# Patient Record
Sex: FEMALE | Race: WHITE | NOT HISPANIC OR LATINO | Employment: FULL TIME | URBAN - METROPOLITAN AREA
[De-identification: names, ages, dates, MRNs, and addresses within clinical notes are randomized per-mention and may not be internally consistent; named-entity substitution may affect disease eponyms.]

---

## 2017-11-13 ENCOUNTER — OFFICE VISIT (OUTPATIENT)
Dept: FAMILY MEDICINE | Facility: CLINIC | Age: 52
End: 2017-11-13
Payer: COMMERCIAL

## 2017-11-13 ENCOUNTER — RADIANT APPOINTMENT (OUTPATIENT)
Dept: GENERAL RADIOLOGY | Facility: CLINIC | Age: 52
End: 2017-11-13
Attending: NURSE PRACTITIONER
Payer: COMMERCIAL

## 2017-11-13 VITALS
WEIGHT: 165 LBS | OXYGEN SATURATION: 99 % | DIASTOLIC BLOOD PRESSURE: 84 MMHG | HEIGHT: 65 IN | HEART RATE: 82 BPM | BODY MASS INDEX: 27.49 KG/M2 | TEMPERATURE: 98.4 F | SYSTOLIC BLOOD PRESSURE: 110 MMHG

## 2017-11-13 DIAGNOSIS — M43.10 ANTEROLISTHESIS: Primary | ICD-10-CM

## 2017-11-13 DIAGNOSIS — S39.012A STRAIN OF LUMBAR REGION, INITIAL ENCOUNTER: ICD-10-CM

## 2017-11-13 PROCEDURE — 99214 OFFICE O/P EST MOD 30 MIN: CPT | Performed by: NURSE PRACTITIONER

## 2017-11-13 PROCEDURE — 72100 X-RAY EXAM L-S SPINE 2/3 VWS: CPT

## 2017-11-13 RX ORDER — CYCLOBENZAPRINE HCL 10 MG
5-10 TABLET ORAL 3 TIMES DAILY PRN
Qty: 30 TABLET | Refills: 1 | Status: SHIPPED | OUTPATIENT
Start: 2017-11-13

## 2017-11-13 RX ORDER — HYDROCODONE BITARTRATE AND ACETAMINOPHEN 5; 325 MG/1; MG/1
1 TABLET ORAL EVERY 6 HOURS PRN
Qty: 18 TABLET | Refills: 0 | Status: SHIPPED | OUTPATIENT
Start: 2017-11-13 | End: 2017-12-14

## 2017-11-13 NOTE — PATIENT INSTRUCTIONS
Will have you follow-up with Orthopedics   Heat or ice may help.   Ibuprofen up to 600 mg four times daily, aleve 2 pills twice daily or acetaminophen 2 pills four times daily may help.   Stronger pain pills can be used if needed. Take muscle relaxant, Flexeril (cyclobenzaprine) up to 3 times a day as needed for pain.   OK to take the Take hydrocodone/acetaminophen 1 or 2 pills every 6 hours as needed for pain.   Recommend physical therapy if not improving.  Will need to follow-up with your primary care provider for a referral if not improving,   Recheck in 2-3 weeks if not improving or other problems.   Staying active will help. Those who continue work and daily activities get better faster. Avoid painful activities.           Understanding Lumbosacral Strain    Lumbosacral strain is a medical term for an injury that causes low back pain. The lumbosacral area (low back) is between the bottom of the ribcage and the top of the buttocks. A strain is tearing of muscles and tendons. These tears can be very small but still cause pain.  How a lumbosacral strain happens  Muscles and tendons connected to the spine can be strained in a number of ways:    Sitting or standing in the same position for long periods of time. This can harm the low back over time. Poor posture can make low back pain more likely.    Moving the muscles and tendons past their usual range of motion. This can cause a sudden injury. This can happen when you twist, bend over, or lift something heavy. Not using correct technique for sports or tasks like lifting can make back injury more likely.    Accidents or falls  Lumbosacral strain can be caused by other problems, but these are less common.  Symptoms of lumbosacral strain  Symptoms may include:    Pain in the back, often on one side    Pain that gets worse with movement and gets better with rest    Inability to move as freely as usual    Swelling, slight redness, and skin warmth in the painful  area  Treatment for lumbosacral strain  Low back pain often goes away by itself within several weeks. But it often comes back. Treatment focuses on reducing pain and avoiding further injury. Bed rest is usually not recommended for low back pain. Treatments may include:    Avoiding or changing the action that caused the problem. This helps prevent injuring the tissues again.    Prescription or over-the-counter pain medicines. These help reduce inflammation, swelling, and pain.    Cold or heat packs. These help reduce pain and swelling.    Stretching and other exercises. These improve flexibility and strength.    Physical therapy. This usually includes exercises and other treatments.    Injections of medicine. This may relieve symptoms.  If these treatments do not relieve symptoms, your healthcare provider may order imaging tests to learn more about the problem. Sometimes you may need surgery.  Possible complications of lumbosacral strain  If the cause of the pain is not addressed, symptoms may return or get worse. Follow your healthcare provider s instructions on lifestyle changes and treating your back.     When to call your healthcare provider  Call your healthcare provider right away if you have any of these:    Fever of 100.4 F (38 C) or higher, or as directed    Numbness, tingling, or weakness    Problems with bowel or bladder control, or problems having sex    Pain that does not go away, or gets worse    New symptoms   Date Last Reviewed: 3/10/2016    3445-5849 [employer]. [address] [place] [address], [place] [address]. All rights reserved. This information is not intended as a substitute for professional medical care. Always follow your healthcare professional's instructions.

## 2017-11-13 NOTE — NURSING NOTE
"Chief Complaint   Patient presents with     Back Pain       Initial /84  Pulse 82  Temp 98.4  F (36.9  C) (Tympanic)  Ht 5' 4.5\" (1.638 m)  Wt 165 lb (74.8 kg)  SpO2 99%  BMI 27.88 kg/m2 Estimated body mass index is 27.88 kg/(m^2) as calculated from the following:    Height as of this encounter: 5' 4.5\" (1.638 m).    Weight as of this encounter: 165 lb (74.8 kg).  Medication Reconciliation: complete    Health Maintenance that is potentially due pending provider review:  NONE    n/a    Is there anyone who you would like to be able to receive your results? Yes  If yes have patient fill out [facility]      "

## 2017-11-13 NOTE — MR AVS SNAPSHOT
After Visit Summary   11/13/2017    Valery Bauman    MRN: [id number]           Patient Information     Date Of Birth          1965        Visit Information        Provider Department      11/13/2017 12:40 PM Taylor Olson APRN [facility]        Today's Diagnoses     Strain of lumbar region, initial encounter    -  1    Spondylolisthesis of lumbosacral region          Care Instructions    Will have you follow-up with Orthopedics   Heat or ice may help.   Ibuprofen up to 600 mg four times daily, aleve 2 pills twice daily or acetaminophen 2 pills four times daily may help.   Stronger pain pills can be used if needed. Take muscle relaxant, Flexeril (cyclobenzaprine) up to 3 times a day as needed for pain.   OK to take the Take hydrocodone/acetaminophen 1 or 2 pills every 6 hours as needed for pain.   Recommend physical therapy if not improving.  Will need to follow-up with your primary care provider for a referral if not improving,   Recheck in 2-3 weeks if not improving or other problems.   Staying active will help. Those who continue work and daily activities get better faster. Avoid painful activities.           Understanding Lumbosacral Strain    Lumbosacral strain is a medical term for an injury that causes low back pain. The lumbosacral area (low back) is between the bottom of the ribcage and the top of the buttocks. A strain is tearing of muscles and tendons. These tears can be very small but still cause pain.  How a lumbosacral strain happens  Muscles and tendons connected to the spine can be strained in a number of ways:    Sitting or standing in the same position for long periods of time. This can harm the low back over time. Poor posture can make low back pain more likely.    Moving the muscles and tendons past their usual range of motion. This can cause a sudden injury. This can happen when you twist, bend over, or lift something heavy. Not using correct technique  for sports or tasks like lifting can make back injury more likely.    Accidents or falls  Lumbosacral strain can be caused by other problems, but these are less common.  Symptoms of lumbosacral strain  Symptoms may include:    Pain in the back, often on one side    Pain that gets worse with movement and gets better with rest    Inability to move as freely as usual    Swelling, slight redness, and skin warmth in the painful area  Treatment for lumbosacral strain  Low back pain often goes away by itself within several weeks. But it often comes back. Treatment focuses on reducing pain and avoiding further injury. Bed rest is usually not recommended for low back pain. Treatments may include:    Avoiding or changing the action that caused the problem. This helps prevent injuring the tissues again.    Prescription or over-the-counter pain medicines. These help reduce inflammation, swelling, and pain.    Cold or heat packs. These help reduce pain and swelling.    Stretching and other exercises. These improve flexibility and strength.    Physical therapy. This usually includes exercises and other treatments.    Injections of medicine. This may relieve symptoms.  If these treatments do not relieve symptoms, your healthcare provider may order imaging tests to learn more about the problem. Sometimes you may need surgery.  Possible complications of lumbosacral strain  If the cause of the pain is not addressed, symptoms may return or get worse. Follow your healthcare provider s instructions on lifestyle changes and treating your back.     When to call your healthcare provider  Call your healthcare provider right away if you have any of these:    Fever of 100.4 F (38 C) or higher, or as directed    Numbness, tingling, or weakness    Problems with bowel or bladder control, or problems having sex    Pain that does not go away, or gets worse    New symptoms   Date Last Reviewed: 3/10/2016    3404-0698 [employer] [address]  [place] [address]. All rights reserved. This information is not intended as a substitute for professional medical care. Always follow your healthcare professional's instructions.              Follow-ups after your visit        Additional Services     ORTHO  REFERRAL       [facility] is referring you to the Orthopedic  Services at [facility].       The  Representative will assist you in the coordination of your Orthopedic and Musculoskeletal Care as prescribed by your physician.    The  Representative will call you within 1 business day to help schedule your appointment, or you may contact the  Representative at:    All areas ~ [phone]     Type of Referral : Spine: Lumbar  **Choose Medical Spine Specialist (unless patient was seen by a Medical Spine Specialist within the past 6 months).**  Surgical Evaluation is advised if the patient presents with one or more of the following red flags: Evidence of Spinal Tumor, Infection or Fracture, Cauda Equina Syndrome, Sudden or Progressive Weakness, Loss of Bowel or Bladder Control, or any other documented emergent neurological condition resulting from a Lumbar Spinal Condition. Medical Spine Specialist        Timeframe requested: 3 - 5 days    Coverage of these services is subject to the terms and limitations of your health insurance plan.  Please call member services at your health plan with any benefit or coverage questions.      If X-rays, CT or MRI's have been performed, please contact the facility where they were done to arrange for , prior to your scheduled appointment.  Please bring this referral request to your appointment and present it to your specialist.                  Who to contact     If you have questions or need follow up information about today's clinic visit or your schedule please contact [facility] directly at  "[phone].  Normal or non-critical lab and imaging results will be communicated to you by MyChart, letter or phone within 4 business days after the clinic has received the results. If you do not hear from us within 7 days, please contact the clinic through [employer] or phone. If you have a critical or abnormal lab result, we will notify you by phone as soon as possible.  Submit refill requests through "[employer] or call your pharmacy and they will forward the refill request to us. Please allow 3 business days for your refill to be completed.          Additional Information About Your Visit        [employer] Information     "[employer] lets you send messages to your doctor, view your test results, renew your prescriptions, schedule appointments and more. To sign up, go to [email or web address] . Click on \"Log in\" on the left side of the screen, which will take you to the Welcome page. Then click on \"Sign up Now\" on the right side of the page.     You will be asked to enter the access code listed below, as well as some personal information. Please follow the directions to create your username and password.     Your access code is: [email or web address]  Expires: 2018  1:38 PM     Your access code will  in 90 days. If you need help or a new code, please call your [facility] clinic or [phone].        Care EveryWhere ID     This is your Care EveryWhere ID. This could be used by other organizations to access your [facility] medical records  [id number]        Your Vitals Were     Pulse Temperature Height Pulse Oximetry BMI (Body Mass Index)       82 98.4  F (36.9  C) (Tympanic) 5' 4.5\" (1.638 m) 99% 27.88 kg/m2        Blood Pressure from Last 3 Encounters:   17 110/84   14 112/69   13 110/70    Weight from Last 3 Encounters:   17 165 lb (74.8 kg)   14 165 lb (74.8 kg)   13 161 lb (73 kg)              We Performed the Following     ORTHO  REFERRAL          Today's Medication " Changes          These changes are accurate as of: 11/13/17  1:38 PM.  If you have any questions, ask your nurse or doctor.               Start taking these medicines.        Dose/Directions    cyclobenzaprine 10 MG tablet   Commonly known as:  FLEXERIL   Used for:  Spondylolisthesis of lumbosacral region   Started by:  Taylor Olson APRN CNP        Dose:  5-10 mg   Take 0.5-1 tablets (5-10 mg) by mouth 3 times daily as needed for muscle spasms   Quantity:  30 tablet   Refills:  1       HYDROcodone-acetaminophen 5-325 MG per tablet   Commonly known as:  NORCO   Used for:  Strain of lumbar region, initial encounter   Started by:  Taylor Olson APRN CNP        Dose:  1 tablet   Take 1 tablet by mouth every 6 hours as needed for pain maximum 4 tablet(s) per day   Quantity:  18 tablet   Refills:  0            Where to get your medicines      These medications were sent to [facility] [address] [place] [address] [address]   [address] [facility] [address]     Phone:  [phone]     cyclobenzaprine 10 MG tablet         Some of these will need a paper prescription and others can be bought over the counter.  Ask your nurse if you have questions.     Bring a paper prescription for each of these medications     HYDROcodone-acetaminophen 5-325 MG per tablet                Primary Care Provider Office Phone # Fax #    Fariha Sosa PA-C [phone] [address] [facility] [address]        Equal Access to Services     [facility] CenterIRENA : Hadii marge Martin, waaxda luqadaha, qaybta kaalmada waqas, clive francois. So [facility] [phone].    ATENCIÓN: Si habla sancho, tiene a leon disposición servicios gratuitos de asistencia lingüística. Llame al [phone].    We comply with applicable federal civil rights laws and [place] laws. We do not discriminate on the basis of race, color, national origin, age, disability, sex, sexual  orientation, or gender identity.            Thank you!     Thank you for choosing [facility]  for your care. Our goal is always to provide you with excellent care. Hearing back from our patients is one way we can continue to improve our services. Please take a few minutes to complete the written survey that you may receive in the mail after your visit with us. Thank you!             Your Updated Medication List - Protect others around you: Learn how to safely use, store and throw away your medicines at www.disposemymeds.org.          This list is accurate as of: 11/13/17  1:38 PM.  Always use your most recent med list.                   Brand Name Dispense Instructions for use Diagnosis    cyclobenzaprine 10 MG tablet    FLEXERIL    30 tablet    Take 0.5-1 tablets (5-10 mg) by mouth 3 times daily as needed for muscle spasms    Spondylolisthesis of lumbosacral region       HYDROcodone-acetaminophen 5-325 MG per tablet    NORCO    18 tablet    Take 1 tablet by mouth every 6 hours as needed for pain maximum 4 tablet(s) per day    Strain of lumbar region, initial encounter

## 2017-11-13 NOTE — PROGRESS NOTES
SUBJECTIVE:   Valery Bauman is a 52 year old female who presents to clinic today for the following health issues:      Back Pain       Duration: 11/3/17         Specific cause: none    Description:   Location of pain: low back right  Character of pain: dull ache  Pain radiation: radiates into hip   New numbness or weakness in legs, not attributed to pain:  no     Intensity: Currently 4/10, At its worst 7/10    History:   Pain interferes with job: YES,   History of back problems: no prior back problems  Any previous MRI or X-rays: None  Sees a specialist for back pain:  No but has seen chiropractor in the past   Therapies tried without relief: aleve, tylenol, Bengay, warming patches     Alleviating factors:   Improved by: last night nothing seemed to help it.       Precipitating factors:  Worsened by: Bending    Functional and Psychosocial Screen (Giorgio STarT Back):      Not performed today          Accompanying Signs & Symptoms:  Risk of Fracture:  None  Risk of Cauda Equina:  None  Risk of Infection:  None  Risk of Cancer:  None  Risk of Ankylosing Spondylitis:  Onset at age <35, male, AND morning back stiffness. no       Problem list and histories reviewed & adjusted, as indicated.  Additional history: as documented    Patient Active Problem List   Diagnosis     CARDIOVASCULAR SCREENING; LDL GOAL LESS THAN 160     Past Surgical History:   Procedure Laterality Date     SURGICAL HISTORY OF -   age 7    right eye-lazy eye surgery     SURGICAL HISTORY OF -   11/2004    arthroscopy right knee       Social History   Substance Use Topics     Smoking status: Never Smoker     Smokeless tobacco: Never Used     Alcohol use Yes      Comment: social     Family History   Problem Relation Age of Onset     CEREBROVASCULAR DISEASE No family hx of      C.A.D. No family hx of      DIABETES No family hx of          No current outpatient prescriptions on file.     Allergies   Allergen Reactions     Mushroom          Reviewed and  "updated as needed this visit by clinical staffTobacco  Allergies  Meds  Med Hx  Surg Hx  Fam Hx  Soc Hx      Reviewed and updated as needed this visit by Provider         ROS:  Constitutional, HEENT, cardiovascular, pulmonary, gi and gu systems are negative, except as otherwise noted.      OBJECTIVE:   /84  Pulse 82  Temp 98.4  F (36.9  C) (Tympanic)  Ht 5' 4.5\" (1.638 m)  Wt 165 lb (74.8 kg)  SpO2 99%  BMI 27.88 kg/m2  Body mass index is 27.88 kg/(m^2).   GENERAL: healthy, alert and no distress  EYES: Eyes grossly normal to inspection, PERRL and conjunctivae and sclerae normal  HENT: ear canals and TM's normal, nose and mouth without ulcers or lesions  NECK: no adenopathy, no asymmetry, masses, or scars and thyroid normal to palpation  RESP: lungs clear to auscultation - no rales, rhonchi or wheezes  CV: regular rate and rhythm, normal S1 S2, no S3 or S4, no murmur, click or rub, no peripheral edema and peripheral pulses strong  MS: no gross musculoskeletal defects noted, no edema  SKIN: no suspicious lesions or rashes  NEURO: Normal strength and tone, mentation intact and speech normal  PSYCH: mentation appears normal, affect normal/bright    Tender:  right para lumbar muscles, right SI joint  Non-tender:  thoracic spinous processes, thoracic facet joints, left parathoracic muscles, right parathoracic muscles, lumbar spinous processes, lumbar facet joints, left para lumbar muscles, left pars articularis, right pars articularis, left SI joint, left sciatic notch, right sciatic notch  Range of Motion:  left lateral thoracic bending   full, right lateral thoracic bending  full, left thoracic rotation  full, right thoracic rotation  full, lumbar flexion  full, lumbar extension  full, left lateral lumbar bending  painful, right lateral lumbar bending  painful, left lateral lumbar rotation  painful, right lateral lumbar rotation  painful  Strength:  able to heel walk, able to toe walk  Special tests:  " negative straight leg raises    Hip Exam: Hip ROM full    LUMBAR SPINE TWO TO THREE VIEWS  11/13/2017 1:24 PM      HISTORY:  Strain of lumbar region, initial encounter.     COMPARISON: None.         IMPRESSION: Anterolisthesis of L5 relative to S1 measuring 12 mm.  Moderate to severe intervertebral disc space narrowing at this  location. Remaining disc spaces are well maintained. No compression  deformity or acute fracture. Calcification overlies the left kidney  measuring up to 8 mm, possibly related to nephrolithiasis.    ASSESSMENT:       ICD-10-CM    1. Strain of lumbar region, initial encounter S39.012A XR Lumbar Spine 2/3 Views     HYDROcodone-acetaminophen (NORCO) 5-325 MG per tablet   2. Spondylolisthesis of lumbosacral region M43.17 ORTHO  REFERRAL     cyclobenzaprine (FLEXERIL) 10 MG tablet         PLAN:   Due to anterolisthesis will have her seen by Orthopedics.    Patient Instructions     Will have you follow-up with Orthopedics   Heat or ice may help.   Ibuprofen up to 600 mg four times daily, aleve 2 pills twice daily or acetaminophen 2 pills four times daily may help.   Stronger pain pills can be used if needed. Take muscle relaxant, Flexeril (cyclobenzaprine) up to 3 times a day as needed for pain.   OK to take the Take hydrocodone/acetaminophen 1 or 2 pills every 6 hours as needed for pain.   Recommend physical therapy if not improving.  Will need to follow-up with your primary care provider for a referral if not improving,   Recheck in 2-3 weeks if not improving or other problems.   Staying active will help. Those who continue work and daily activities get better faster. Avoid painful activities.           Understanding Lumbosacral Strain    Lumbosacral strain is a medical term for an injury that causes low back pain. The lumbosacral area (low back) is between the bottom of the ribcage and the top of the buttocks. A strain is tearing of muscles and tendons. These tears can be very small  but still cause pain.  How a lumbosacral strain happens  Muscles and tendons connected to the spine can be strained in a number of ways:    Sitting or standing in the same position for long periods of time. This can harm the low back over time. Poor posture can make low back pain more likely.    Moving the muscles and tendons past their usual range of motion. This can cause a sudden injury. This can happen when you twist, bend over, or lift something heavy. Not using correct technique for sports or tasks like lifting can make back injury more likely.    Accidents or falls  Lumbosacral strain can be caused by other problems, but these are less common.  Symptoms of lumbosacral strain  Symptoms may include:    Pain in the back, often on one side    Pain that gets worse with movement and gets better with rest    Inability to move as freely as usual    Swelling, slight redness, and skin warmth in the painful area  Treatment for lumbosacral strain  Low back pain often goes away by itself within several weeks. But it often comes back. Treatment focuses on reducing pain and avoiding further injury. Bed rest is usually not recommended for low back pain. Treatments may include:    Avoiding or changing the action that caused the problem. This helps prevent injuring the tissues again.    Prescription or over-the-counter pain medicines. These help reduce inflammation, swelling, and pain.    Cold or heat packs. These help reduce pain and swelling.    Stretching and other exercises. These improve flexibility and strength.    Physical therapy. This usually includes exercises and other treatments.    Injections of medicine. This may relieve symptoms.  If these treatments do not relieve symptoms, your healthcare provider may order imaging tests to learn more about the problem. Sometimes you may need surgery.  Possible complications of lumbosacral strain  If the cause of the pain is not addressed, symptoms may return or get worse.  Follow your healthcare provider s instructions on lifestyle changes and treating your back.     When to call your healthcare provider  Call your healthcare provider right away if you have any of these:    Fever of 100.4 F (38 C) or higher, or as directed    Numbness, tingling, or weakness    Problems with bowel or bladder control, or problems having sex    Pain that does not go away, or gets worse    New symptoms   Date Last Reviewed: 3/10/2016    8028-9297 [employer]. [address] [place] [address]. All rights reserved. This information is not intended as a substitute for professional medical care. Always follow your healthcare professional's instructions.           ADELA Kruger [facility]

## 2017-11-18 ENCOUNTER — HEALTH MAINTENANCE LETTER (OUTPATIENT)
Age: 52
End: 2017-11-18

## 2017-11-22 ENCOUNTER — OFFICE VISIT (OUTPATIENT)
Dept: ORTHOPEDICS | Facility: CLINIC | Age: 52
End: 2017-11-22
Payer: COMMERCIAL

## 2017-11-22 ENCOUNTER — RADIANT APPOINTMENT (OUTPATIENT)
Dept: GENERAL RADIOLOGY | Facility: CLINIC | Age: 52
End: 2017-11-22
Attending: PEDIATRICS
Payer: COMMERCIAL

## 2017-11-22 VITALS
HEIGHT: 65 IN | SYSTOLIC BLOOD PRESSURE: 120 MMHG | WEIGHT: 169.4 LBS | DIASTOLIC BLOOD PRESSURE: 73 MMHG | BODY MASS INDEX: 28.22 KG/M2

## 2017-11-22 DIAGNOSIS — M54.50 LUMBAGO: ICD-10-CM

## 2017-11-22 DIAGNOSIS — M43.16 SPONDYLOLISTHESIS OF LUMBAR REGION: Primary | ICD-10-CM

## 2017-11-22 PROCEDURE — 99244 OFF/OP CNSLTJ NEW/EST MOD 40: CPT | Performed by: PEDIATRICS

## 2017-11-22 PROCEDURE — 72100 X-RAY EXAM L-S SPINE 2/3 VWS: CPT

## 2017-11-22 NOTE — PATIENT INSTRUCTIONS
Plan:  - Today's Plan of Care:  Rehab: Physical Therapy: [facility] - [phone]  Physical Medicine and Rehab referral at [facility] with Dr. Singer      Follow Up: as needed

## 2017-11-22 NOTE — PROGRESS NOTES
Sports Medicine Clinic Visit    PCP: Fariha Sosasoraya Bauman is a 52 year old female who is seen  in consultation at the request of  Taylor Olson C.N.P. presenting with low back pain.    Injury: Patient reports gradual insidious onset of back pain around 3 weeks ago.  No injury.  Right side of low back and into right hip.  No pain down legs, numbness or tingling.  Pain is improved somewhat.  Has a history of mild pain in the past, nothing lasting this long.    Valery was asked to complete the Oswestry Low Back Disability Index and Giorgio Start Back screening tool.  today in the office.  Disability score: 8%.     Location of Pain: right low back radiating into posterolateral hip  Duration of Pain: ~3 week(s)  Rating of Pain at worst: 8/10  Rating of Pain Currently: 1/10  Symptoms are better with: Heat, Aleve, Tylenol, Rest and Stretching  Symptoms are worse with: Twisting, bending over at the waist, heavy lifting especially overhead, prolonged standing  Additional Features:   Positive: dull ache and radiating pain   Negative: popping, grinding, catching, locking, instability, paresthesias, numbness and weakness  Other evaluation and/or treatments so far consists of: Heat, Aleve, Tylenol, Rest and Stretching  Prior History of related problems: Back pain in the past but nothing that required treatment    Social History: [employer],  (very physical)    Review of Systems  Skin: no bruising, no swelling  Musculoskeletal: as above  Neurologic: no numbness, paresthesias  Remainder of review of systems is negative including constitutional, CV, pulmonary, GI, except as noted in HPI or medical history.    Patient's current problem list, past medical and surgical history, and family history were reviewed.    Patient Active Problem List   Diagnosis     CARDIOVASCULAR SCREENING; LDL GOAL LESS THAN 160     No past medical history on file.  Past Surgical History:   Procedure Laterality Date      "SURGICAL HISTORY OF -   age 7    right eye-lazy eye surgery     SURGICAL HISTORY OF -   11/2004    arthroscopy right knee     Family History   Problem Relation Age of Onset     CEREBROVASCULAR DISEASE No family hx of      C.A.D. No family hx of      DIABETES No family hx of        Objective  /73  Ht 5' 4.5\" (1.638 m)  Wt 169 lb 6.4 oz (76.8 kg)  BMI 28.63 kg/m2    GENERAL APPEARANCE: healthy, alert and no distress   GAIT: NORMAL  SKIN: no suspicious lesions or rashes  HEENT: Sclera clear, anicteric  CV: good peripheral pulses  RESP: Breathing not labored  NEURO: Normal strength and tone, mentation intact and speech normal  PSYCH:  mentation appears normal and affect normal/bright    Low back exam:  Inspection:     no visible deformity in the low back       normal skin       normal vascular       normal lymphatic       no asymmetry    Posture:      normal    Foot Inspection:     no deformity noted    Tender:     None currently    Non Tender:     remainder of lumbar spine    ROM:      limited flexion due to pain       limited extension due to pain    Strength:     hip flexion 5/5 bilateral       knee extension 5/5 bilateral       ankle dorsiflexion 5/5 bilateral       ankle plantarflexion 5/5 bilateral       dorsiflexion of the great toe 5/5 bilateral       able to heel and toe walk    Reflexes:     patellar (L3, L4) symmetric normal       achilles tendons (S1) symmetric normal    Sensation:    grossly intact throughout lower extremities    Special tests:      straight leg raise left negative        straight leg raise right negative       neg (-) HEATHER  bilateral       neg (-) FADIR  bilateral    Radiology  I visualized and reviewed these images with the patient  LUMBAR SPINE TWO TO THREE VIEWS  11/13/2017 1:24 PM   HISTORY:  Strain of lumbar region, initial encounter.  COMPARISON: None.  IMPRESSION: Anterolisthesis of L5 relative to S1 measuring 12 mm.  Moderate to severe intervertebral disc space narrowing " at this  location. Remaining disc spaces are well maintained. No compression  deformity or acute fracture. Calcification overlies the left kidney  measuring up to 8 mm, possibly related to nephrolithiasis.    I ordered, visualized and reviewed these images with the patient  Flexion and Extension Lumbar X-rays taken today; 12 mm of listhesis that appears unstable with flexion and extension  - will await official results    Assessment:  1. Spondylolisthesis of lumbar region      Unstable spondylolisthesis.  Clinically doing better. We discussed the following treatment options: symptom treatment, activity modification/rest, imaging and rehab. Following discussion, plan: will refer to physical therapy and I recommend consultation with spine specialists given spondylolisthesis.    Plan:  - Today's Plan of Care:  Rehab: [phone]  Physical Medicine and Rehab referral at [facility] with Dr. Singer    Follow Up: as needed    Concerning signs and symptoms were reviewed.  The patient expressed understanding of this management plan and all questions were answered at this time.    Thanks for the opportunity to participate in the care of this patient, I will keep you updated on their progress.    CC: Taylor Hines MD CAQ  Primary Care Sports Medicine  [facility]

## 2017-11-22 NOTE — MR AVS SNAPSHOT
After Visit Summary   11/22/2017    Valery Bauman    MRN: [id number]           Patient Information     Date Of Birth          1965        Visit Information        Provider Department      11/22/2017 9:20 AM Carla Hines MD [facility]        Today's Diagnoses     Spondylolisthesis of lumbar region    -  1      Care Instructions    Plan:  - Today's Plan of Care:  Rehab: Physical Therapy: [facility] - [phone]  Physical Medicine and Rehab referral at [facility] with Dr. Singer      Follow Up: as needed              Follow-ups after your visit        Additional Services     ORTHO  REFERRAL       [facility] is referring you to the Orthopedic  Services at [facility].       The  Representative will assist you in the coordination of your Orthopedic and Musculoskeletal Care as prescribed by your physician.    The  Representative will call you within 1 business day to help schedule your appointment, or you may contact the  Representative at:    All areas ~ [phone]     Type of Referral : Physical Medicine and Rehab with Dr. Singer at [facility]       Timeframe requested: Routine    Coverage of these services is subject to the terms and limitations of your health insurance plan.  Please call member services at your health plan with any benefit or coverage questions.      If X-rays, CT or MRI's have been performed, please contact the facility where they were done to arrange for , prior to your scheduled appointment.  Please bring this referral request to your appointment and present it to your specialist.            PHYSICAL THERAPY REFERRAL       *This therapy referral will be filtered to a centralized scheduling office at [facility] and the patient will receive a call to schedule an appointment at a [facility] location most convenient for them. *  "    [facility] provides Physical Therapy evaluation and treatment and many specialty services across the [facility] system.  If requesting a specialty program, please choose from the list below.    If you have not heard from the scheduling office within 2 business days, please call [phone] for all locations, with the exception of [place], please call [phone].  Treatment: Evaluation & Treatment  Special Instructions/Modalities: None  Special Programs: None    Please be aware that coverage of these services is subject to the terms and limitations of your health insurance plan.  Call member services at your health plan with any benefit or coverage questions.      **Note to Provider:  If you are referring outside of [facility] for the therapy appointment, please list the name of the location in the \"special instructions\" above, print the referral and give to the patient to schedule the appointment.                  Who to contact     If you have questions or need follow up information about today's clinic visit or your schedule please contact [facility] directly at [phone].  Normal or non-critical lab and imaging results will be communicated to you by Aniikahart, letter or phone within 4 business days after the clinic has received the results. If you do not hear from us within 7 days, please contact the clinic through Aniikahart or phone. If you have a critical or abnormal lab result, we will notify you by phone as soon as possible.  Submit refill requests through Implisit or call your pharmacy and they will forward the refill request to us. Please allow 3 business days for your refill to be completed.          Additional Information About Your Visit        Implisit Information     Implisit lets you send messages to your doctor, view your test results, renew your prescriptions, schedule appointments and more. To sign up, go to [email or web address] . Click on " "\"Log in\" on the left side of the screen, which will take you to the Welcome page. Then click on \"Sign up Now\" on the right side of the page.     You will be asked to enter the access code listed below, as well as some personal information. Please follow the directions to create your username and password.     Your access code is: [email or web address]  Expires: 2018  1:38 PM     Your access code will  in 90 days. If you need help or a new code, please call your [facility] clinic or [phone].        Care EveryWhere ID     This is your [facility] EveryWhere ID. This could be used by other organizations to access your [facility] medical records  [id number]        Your Vitals Were     Height BMI (Body Mass Index)                5' 4.5\" (1.638 m) 28.63 kg/m2           Blood Pressure from Last 3 Encounters:   17 120/73   17 110/84   14 112/69    Weight from Last 3 Encounters:   17 169 lb 6.4 oz (76.8 kg)   17 165 lb (74.8 kg)   14 165 lb (74.8 kg)              We Performed the Following     ORTHO  REFERRAL     PHYSICAL THERAPY REFERRAL        Primary Care Provider Office Phone # Fax #    Fariha Sosa PA-C [phone] [address] [place] [address]        Equal Access to Services     [facility] AH: Hadii marge syo Sohazel, waaxda luqadaha, qaybta kaalmada adenakiayada, clive francois. So [facility] [phone].    ATENCIÓN: Si poojala snacho, tiene a leon disposición servicios gratuitos de asistencia lingüística. Lela al [phone].    We comply with applicable federal civil rights laws and [place] laws. We do not discriminate on the basis of race, color, national origin, age, disability, sex, sexual orientation, or gender identity.            Thank you!     Thank you for choosing [facility]  for your care. Our goal is always to provide you with excellent care. Hearing back from our " patients is one way we can continue to improve our services. Please take a few minutes to complete the written survey that you may receive in the mail after your visit with us. Thank you!             Your Updated Medication List - Protect others around you: Learn how to safely use, store and throw away your medicines at www.disposemymeds.org.          This list is accurate as of: 11/22/17  9:52 AM.  Always use your most recent med list.                   Brand Name Dispense Instructions for use Diagnosis    cyclobenzaprine 10 MG tablet    FLEXERIL    30 tablet    Take 0.5-1 tablets (5-10 mg) by mouth 3 times daily as needed for muscle spasms    Anterolisthesis       HYDROcodone-acetaminophen 5-325 MG per tablet    NORCO    18 tablet    Take 1 tablet by mouth every 6 hours as needed for pain maximum 4 tablet(s) per day    Strain of lumbar region, initial encounter

## 2017-11-22 NOTE — LETTER
11/22/2017         RE: Valery Bauman  [address] [place] [address]        Dear Colleague,    Thank you for referring your patient, Valery Bauman, to the [facility]. Please see a copy of my visit note below.    Sports Medicine Clinic Visit    PCP: Fariha Soas    Valery Bauman is a 52 year old female who is seen  in consultation at the request of  Taylor Olson C.N.P. presenting with low back pain.    Injury: Patient reports gradual insidious onset of back pain around 3 weeks ago.  No injury.  Right side of low back and into right hip.  No pain down legs, numbness or tingling.  Pain is improved somewhat.  Has a history of mild pain in the past, nothing lasting this long.    Valery was asked to complete the Oswestry Low Back Disability Index and Giorgio Start Back screening tool.  today in the office.  Disability score: 8%.     Location of Pain: right low back radiating into posterolateral hip  Duration of Pain: ~3 week(s)  Rating of Pain at worst: 8/10  Rating of Pain Currently: 1/10  Symptoms are better with: Heat, Aleve, Tylenol, Rest and Stretching  Symptoms are worse with: Twisting, bending over at the waist, heavy lifting especially overhead, prolonged standing  Additional Features:   Positive: dull ache and radiating pain   Negative: popping, grinding, catching, locking, instability, paresthesias, numbness and weakness  Other evaluation and/or treatments so far consists of: Heat, Aleve, Tylenol, Rest and Stretching  Prior History of related problems: Back pain in the past but nothing that required treatment    Social History: [employer],  (very physical)    Review of Systems  Skin: no bruising, no swelling  Musculoskeletal: as above  Neurologic: no numbness, paresthesias  Remainder of review of systems is negative including constitutional, CV, pulmonary, GI, except as noted in HPI or medical history.    Patient's current problem list,  "past medical and surgical history, and family history were reviewed.    Patient Active Problem List   Diagnosis     CARDIOVASCULAR SCREENING; LDL GOAL LESS THAN 160     No past medical history on file.  Past Surgical History:   Procedure Laterality Date     SURGICAL HISTORY OF -   age 7    right eye-lazy eye surgery     SURGICAL HISTORY OF -   11/2004    arthroscopy right knee     Family History   Problem Relation Age of Onset     CEREBROVASCULAR DISEASE No family hx of      C.A.D. No family hx of      DIABETES No family hx of        Objective  /73  Ht 5' 4.5\" (1.638 m)  Wt 169 lb 6.4 oz (76.8 kg)  BMI 28.63 kg/m2    GENERAL APPEARANCE: healthy, alert and no distress   GAIT: NORMAL  SKIN: no suspicious lesions or rashes  HEENT: Sclera clear, anicteric  CV: good peripheral pulses  RESP: Breathing not labored  NEURO: Normal strength and tone, mentation intact and speech normal  PSYCH:  mentation appears normal and affect normal/bright    Low back exam:  Inspection:     no visible deformity in the low back       normal skin       normal vascular       normal lymphatic       no asymmetry    Posture:      normal    Foot Inspection:     no deformity noted    Tender:     None currently    Non Tender:     remainder of lumbar spine    ROM:      limited flexion due to pain       limited extension due to pain    Strength:     hip flexion 5/5 bilateral       knee extension 5/5 bilateral       ankle dorsiflexion 5/5 bilateral       ankle plantarflexion 5/5 bilateral       dorsiflexion of the great toe 5/5 bilateral       able to heel and toe walk    Reflexes:     patellar (L3, L4) symmetric normal       achilles tendons (S1) symmetric normal    Sensation:    grossly intact throughout lower extremities    Special tests:      straight leg raise left negative        straight leg raise right negative       neg (-) HEATHER  bilateral       neg (-) FADIR  bilateral    Radiology  I visualized and reviewed these images with the " patient  LUMBAR SPINE TWO TO THREE VIEWS  11/13/2017 1:24 PM   HISTORY:  Strain of lumbar region, initial encounter.  COMPARISON: None.  IMPRESSION: Anterolisthesis of L5 relative to S1 measuring 12 mm.  Moderate to severe intervertebral disc space narrowing at this  location. Remaining disc spaces are well maintained. No compression  deformity or acute fracture. Calcification overlies the left kidney  measuring up to 8 mm, possibly related to nephrolithiasis.    I ordered, visualized and reviewed these images with the patient  Flexion and Extension Lumbar X-rays taken today; 12 mm of listhesis that appears unstable with flexion and extension  - will await official results    Assessment:  1. Spondylolisthesis of lumbar region      Unstable spondylolisthesis.  Clinically doing better. We discussed the following treatment options: symptom treatment, activity modification/rest, imaging and rehab. Following discussion, plan: will refer to physical therapy and I recommend consultation with spine specialists given spondylolisthesis.    Plan:  - Today's Plan of Care:  Rehab: [phone]  Physical Medicine and Rehab referral at [facility] with Dr. Singer    Follow Up: as needed    Concerning signs and symptoms were reviewed.  The patient expressed understanding of this management plan and all questions were answered at this time.    Thanks for the opportunity to participate in the care of this patient, I will keep you updated on their progress.    CC: Taylor Hines MD CAQ  Primary Care Sports Medicine  [facility]    Again, thank you for allowing me to participate in the care of your patient.        Sincerely,        Carla Hines MD

## 2017-11-29 ENCOUNTER — TRANSFERRED RECORDS (OUTPATIENT)
Dept: HEALTH INFORMATION MANAGEMENT | Facility: CLINIC | Age: 52
End: 2017-11-29

## 2017-12-06 ENCOUNTER — HOSPITAL ENCOUNTER (OUTPATIENT)
Dept: PHYSICAL THERAPY | Facility: CLINIC | Age: 52
Setting detail: THERAPIES SERIES
End: 2017-12-06
Attending: PEDIATRICS
Payer: COMMERCIAL

## 2017-12-06 PROCEDURE — 40000718 ZZHC STATISTIC PT DEPARTMENT ORTHO VISIT: Performed by: PHYSICAL THERAPIST

## 2017-12-06 PROCEDURE — 97161 PT EVAL LOW COMPLEX 20 MIN: CPT | Mod: GP | Performed by: PHYSICAL THERAPIST

## 2017-12-06 PROCEDURE — 97535 SELF CARE MNGMENT TRAINING: CPT | Mod: GP | Performed by: PHYSICAL THERAPIST

## 2017-12-06 PROCEDURE — 97110 THERAPEUTIC EXERCISES: CPT | Mod: GP | Performed by: PHYSICAL THERAPIST

## 2017-12-06 NOTE — PROGRESS NOTES
12/06/17 1500   General Information   Type of Visit Initial OP Ortho PT Evaluation   Start of Care Date 12/06/17   Referring Physician Carla Hines / Christiano aDvis   Patient/Family Goals Statement Exercise program, Education for Limitations.    Orders Evaluate and Treat   Date of Order 11/22/17   Insurance Type [employer] Cross   Insurance Comments/Visits Authorized 20/year    Medical Diagnosis Spondylolisthesis of the LB. Grade 2.    Surgical/Medical history reviewed (R Knee arthroscopy x 2 for meniscus, Menopause, OA. )   Precautions/Limitations no known precautions/limitations   General Information Comments Mm relaxor and Hydrocodone as needed.    Body Part(s)   Body Part(s) Lumbar Spine/SI   Presentation and Etiology   Pertinent history of current problem (include personal factors and/or comorbidities that impact the POC) Insidious onset, on a friday her back started to hurt at lunch. Got progressively more P into the R hip. No injury. In Sept of 2001, was told by chiropractor that she had spondylolisthesis. Told it would wax and wane. This time it just kept hurting.    Impairments A. Pain   Functional Limitations perform required work activities;perform desired leisure / sports activities   Symptom Location P R LB when it does occur.    How/Where did it occur From insidious onset   Onset date of current episode/exacerbation 11/03/17  (When back started to hurt. )   Chronicity Chronic   Pain rating (0-10 point scale) Denies pain   Pain quality C. Aching   Frequency of pain/symptoms D. Other   Pain frequency comment Haven't had pain since 11/26/17    Pain/symptoms are: The same all the time   Pain/symptoms exacerbated by H. Overhead reach;M. Other   Pain exacerbation comment Prolonged standing    Pain/symptoms eased by B. Walking;E. Changing positions   Progression of symptoms since onset: Improved   Current / Previous Interventions   Diagnostic Tests: X-ray   X-ray Results Results   X-ray results Grade 2  Spondylolisthesis.    Prior Level of Function   Functional Level Prior Comment Independent w/ ADL's.    Current Level of Function   Current Community Support Family/friend caregiver   Patient role/employment history A. 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    Living environment House/townhome   Home/community accessibility Stairs, but no problems, When P was active, used handrails more.    Fall Risk Screen   Fall screen completed by PT   Have you fallen 2 or more times in the past year? No   Have you fallen and had an injury in the past year? No   Timed Up and Go score (seconds) Walked briskly into dept.    Is patient a fall risk? No   System Outcome Measures   Outcome Measures Low Back Pain (see Oswestry and Giorgio)   Functional Scales   Functional Scales Patient Specific Functional Scale   Patient Specific Functional Scale Q1:;Q2:;Q3:   Q1: Proloned standing increases P   Q2: Reaching overhead w/ objects increases P.    Lumbar Spine/SI Objective Findings   Observation No acute distress.    Integumentary Mm bulk across lower back.    Posture Slight Head forward   Gait/Locomotion Normal on Level and Stairs.    Flexion ROM 90%   Extension ROM 50% w/ twinge at end    Right Side Bending ROM Normal    Left Side Bending ROM Normal   Pelvic Screen Normal    Hip Screen HEATHERLOAN RIOS Negative.    Transversus Abdominus Strength (Johnathan Leg Lowering-deg) Good    Hip Flexion (L2) Strength 5   Hip Abduction Strength 5   Hip Adduction Strength 5   Hip Extension Strength 5   Knee Flexion Strength 5   Knee Extension (L3) Strength 5   Ankle Dorsiflexion (L4) Strength 5   Hamstring Flexibility R -45, L -20.    Hip Flexor Flexibility Normal    Quadricep Flexibility Normal    Obers Flexibility Normal    Piriformis Flexibility Tight B    SLR Negative   Sunil Test Normal    Segmental Mobility Sitting Flex and Ext Normal    Palpation R >L Q.L, B Piriformis Mm's.    Planned Therapy Interventions    Planned Therapy Interventions Comment Develop HEP for strengthening core and pelvic region to prevent further injury.   Planned Modality Interventions   Planned Modality Interventions Comments None    Clinical Impression   Criteria for Skilled Therapeutic Interventions Met yes, treatment indicated   PT Diagnosis Sx's consistent w/ spondylolisthesis, Mm imbalance.    Influenced by the following impairments Pain,    Functional limitations due to impairments Work duties, prolonged standing.    Clinical Presentation Evolving/Changing   Clinical Presentation Rationale Low Giorgio and VIRGEN, 2 knee surgeries, Mm imbalance.    Clinical Decision Making (Complexity) Low complexity   Therapy Frequency 1 time/week   Predicted Duration of Therapy Intervention (days/wks) 3 weeks, for 3 vsits.    Risk & Benefits of therapy have been explained Yes   Patient, Family & other staff in agreement with plan of care Yes   Clinical Impression Comments Sx's consistent w/ spondylolisthesis, Mm imbalance.    Education Assessment   Preferred Learning Style Listening;Demonstration;Pictures/video   Barriers to Learning No barriers   ORTHO GOALS   PT Ortho Eval Goals 1;2   Ortho Goal 1   Goal Identifier 1   Goal Description STG: Pt will be independent w/HEP to strengthen core and prevent reinjury.    Target Date 12/29/17   Ortho Goal 2   Goal Identifier 2   Goal Description STG: Pt will be aware of limitations to prevent reinjury.    Target Date 12/29/17   Total Evaluation Time   Total Evaluation Time 45 Total (Eval x 20, Educ x 10, Ex x 15)    Cynthia Knowles PT, [facility] (#[id number])  [facility]           [phone]  Fax          [phone]  Appt #      [phone]

## 2017-12-14 ENCOUNTER — OFFICE VISIT (OUTPATIENT)
Dept: FAMILY MEDICINE | Facility: CLINIC | Age: 52
End: 2017-12-14
Payer: COMMERCIAL

## 2017-12-14 VITALS
BODY MASS INDEX: 27.66 KG/M2 | DIASTOLIC BLOOD PRESSURE: 75 MMHG | SYSTOLIC BLOOD PRESSURE: 121 MMHG | WEIGHT: 166 LBS | HEIGHT: 65 IN | HEART RATE: 82 BPM | TEMPERATURE: 99 F

## 2017-12-14 DIAGNOSIS — R22.1 NECK MASS: Primary | ICD-10-CM

## 2017-12-14 DIAGNOSIS — Z12.11 SPECIAL SCREENING FOR MALIGNANT NEOPLASMS, COLON: ICD-10-CM

## 2017-12-14 DIAGNOSIS — Z12.31 ENCOUNTER FOR SCREENING MAMMOGRAM FOR BREAST CANCER: ICD-10-CM

## 2017-12-14 PROCEDURE — 99213 OFFICE O/P EST LOW 20 MIN: CPT | Performed by: PHYSICIAN ASSISTANT

## 2017-12-14 NOTE — PROGRESS NOTES
"  SUBJECTIVE:   Valery aBuman is a 52 year old female who presents to clinic today for the following health issues:      Lump      Duration: 1 week    Description (location/character/radiation): Right side neck    Intensity:  moderate    Accompanying signs and symptoms: Started out small, thought it was a pimple under the skin, left it alone.  Has since grown, is now tender to touch.  No redness    History (similar episodes/previous evaluation): None    Precipitating or alleviating factors: None    Therapies tried and outcome: None     Not overtly painful but tender.  Impacted her opening jaw other day to eat.  No pain in mouth.  Did have tooth break off 2 months ago and continues to come out in pieces.  No known abscess.  No fever or chills.  No voice hoarseness.  No ear pain.    Due for pap, mammo, colon screening.    Works at [employer].    Problem list and histories reviewed & adjusted, as indicated.  Additional history: as documented    BP Readings from Last 3 Encounters:   12/14/17 121/75   11/22/17 120/73   11/13/17 110/84    Wt Readings from Last 3 Encounters:   12/14/17 166 lb (75.3 kg)   11/22/17 169 lb 6.4 oz (76.8 kg)   11/13/17 165 lb (74.8 kg)         Labs reviewed in EPIC    Reviewed and updated as needed this visit by clinical staffTobacco  Allergies  Meds  Problems  Med Hx  Surg Hx  Fam Hx  Soc Hx        Reviewed and updated as needed this visit by Provider  Allergies  Meds  Problems  Med Hx  Surg Hx  Fam Hx         ROS:  Constitutional, HEENT, musculoskeletal, neuro, skin,  systems are negative, except as otherwise noted.    OBJECTIVE:   /75 (BP Location: Right arm, Patient Position: Chair, Cuff Size: Adult Regular)  Pulse 82  Temp 99  F (37.2  C) (Tympanic)  Ht 5' 4.5\" (1.638 m)  Wt 166 lb (75.3 kg)  BMI 28.05 kg/m2  Body mass index is 28.05 kg/(m^2).  GENERAL: healthy, alert and no distress  HENT: ear canals and TM's normal, mouth without ulcers or lesions, lower R last molar " broken at base, no tenderness near or on this area and no drainage, no obvious blocked salivary duct  NECK: no adenopathy, no asymmetry, masses, or scars.  Area in question is not particularly visible from lateral view but slight swelling visible when looking at patient head-on.  This is not particularly palpable and is certainly not a discrete mass.    ASSESSMENT/PLAN:       ICD-10-CM    1. Neck mass R22.1 OTOLARYNGOLOGY REFERRAL   2. Encounter for screening mammogram for breast cancer Z12.31    3. Special screening for malignant neoplasms, colon Z12.11 GASTROENTEROLOGY ADULT REF PROCEDURE ONLY       Patient Instructions   Try lemon drops - a lot of lemon drops over next few days, in case this is salivary gland issue (most likely)  If not improving, see either dentist to rule out tooth infection, or see ENT   Discussed this is intermediate before any CT scan.    If drastically worsening, fever, bad pain, much more swollen, etc - CT needed.    Set up physical for pap and breast exam    [facility] Mammo Schedule  [facility] ~ [phone]  One Day Weekly- Alternating Days  [place] ~ [phone]  Every other Monday or Wednesday   & one Saturday morning a month    [place] ~ [phone]  Every Other Monday Morning    [place] ~ [phone]  Every Other Monday Afternoon    [place] ~ [phone]  Every Monday morning  Every Tuesday afternoon       Wed, Thurs, Friday morning & afternoon        Fariha Sosa PA-C  [facility]

## 2017-12-14 NOTE — NURSING NOTE
"Chief Complaint   Patient presents with     Mass       Initial /75 (BP Location: Right arm, Patient Position: Chair, Cuff Size: Adult Regular)  Pulse 82  Temp 99  F (37.2  C) (Tympanic)  Ht 5' 4.5\" (1.638 m)  Wt 166 lb (75.3 kg)  BMI 28.05 kg/m2 Estimated body mass index is 28.05 kg/(m^2) as calculated from the following:    Height as of this encounter: 5' 4.5\" (1.638 m).    Weight as of this encounter: 166 lb (75.3 kg).  Medication Reconciliation: complete    Health Maintenance that is potentially due pending provider review:  Mammogram, Pap Smear and Colonoscopy/FIT    Patient will call and schedule    Is there anyone who you would like to be able to receive your results? No  If yes have patient fill out SALVATORE      "

## 2017-12-14 NOTE — PATIENT INSTRUCTIONS
Try lemon drops - a lot of lemon drops over next few days, in case this is salivary gland issue (most likely)  If not improving, see either dentist to rule out tooth infection, or see ENT   Discussed this is intermediate before any CT scan.    If drastically worsening, fever, bad pain, much more swollen, etc - CT needed.    Set up physical for pap and breast exam    [facility] Mammo Schedule  [facility] ~ [phone]  One Day Weekly- Alternating Days  [place] ~ [phone]  Every other Monday or Wednesday   & one Saturday morning a month    [place] ~ [phone]  Every Other Monday Morning    [place] ~ [phone]  Every Other Monday Afternoon    [place] ~ [phone]  Every Monday morning  Every Tuesday afternoon       Wed, Thurs, Friday morning & afternoon

## 2017-12-14 NOTE — MR AVS SNAPSHOT
After Visit Summary   12/14/2017    Valery Bauman    MRN: [id number]           Patient Information     Date Of Birth          1965        Visit Information        Provider Department      12/14/2017 8:20 AM Fariha Sosa PA-C [facility]        Today's Diagnoses     Neck mass    -  1    Encounter for screening mammogram for breast cancer        Special screening for malignant neoplasms, colon          Care Instructions    Try lemon drops - a lot of lemon drops over next few days, in case this is salivary gland issue (most likely)  If not improving, see either dentist to rule out tooth infection, or see ENT   Discussed this is intermediate before any CT scan.    If drastically worsening, fever, bad pain, much more swollen, etc - CT needed.    Set up physical for pap and breast exam    [facility] Mammo Schedule  [facility] ~ [phone]  One Day Weekly- Alternating Days  [place] ~ [phone]  Every other Monday or Wednesday   & one Saturday morning a month    [place] ~ [phone]  Every Other Monday Morning    [place] ~ [phone]  Every Other Monday Afternoon    [place] ~ [phone]  Every Monday morning  Every Tuesday afternoon       Wed, Thurs, Friday morning & afternoon            Follow-ups after your visit        Additional Services     GASTROENTEROLOGY ADULT REF PROCEDURE ONLY       Last Lab Result: No results found for: CR  Body mass index is 28.05 kg/(m^2).     Needed:  No  Language:  English    Patient will be contacted to schedule procedure.     Please be aware that coverage of these services is subject to the terms and limitations of your health insurance plan.  Call member services at your health plan with any benefit or coverage questions.  Any procedures must be performed at a [place] facility OR coordinated by your clinic's referral office.    Please bring the following with you to your appointment:    (1) Any X-Rays, CTs or  MRIs which have been performed.  Contact the facility where they were done to arrange for  prior to your scheduled appointment.    (2) List of current medications   (3) This referral request   (4) Any documents/labs given to you for this referral            OTOLARYNGOLOGY REFERRAL       Your provider has referred you to: ALEXANDER: [facility] [phone]   [email or web address]    Please be aware that coverage of these services is subject to the terms and limitations of your health insurance plan.  Call member services at your health plan with any benefit or coverage questions.      Please bring the following with you to your appointment:    (1) Any X-Rays, CTs or MRIs which have been performed.  Contact the facility where they were done to arrange for  prior to your scheduled appointment.   (2) List of current medications  (3) This referral request   (4) Any documents/labs given to you for this referral                  Who to contact     If you have questions or need follow up information about today's clinic visit or your schedule please contact [facility] directly at [phone].  Normal or non-critical lab and imaging results will be communicated to you by MyChart, letter or phone within 4 business days after the clinic has received the results. If you do not hear from us within 7 days, please contact the clinic through [employer] or phone. If you have a critical or abnormal lab result, we will notify you by phone as soon as possible.  Submit refill requests through [employer] or call your pharmacy and they will forward the refill request to us. Please allow 3 business days for your refill to be completed.          Additional Information About Your Visit        [facility] Information     [employer] lets you send messages to your doctor, view your test results, renew your prescriptions, schedule appointments and more. To sign up, go to  [email or web address] . Click on \"Log in\" on the left side of the screen, which will take you to the Welcome page. Then click on \"Sign up Now\" on the right side of the page.     You will be asked to enter the access code listed below, as well as some personal information. Please follow the directions to create your username and password.     Your access code is: [email or web address]  Expires: 2018  1:38 PM     Your access code will  in 90 days. If you need help or a new code, please call your [place] clinic or [phone].        Care EveryWhere ID     This is your Care EveryWhere ID. This could be used by other organizations to access your [place] medical records  [id number]        Your Vitals Were     Pulse Temperature Height BMI (Body Mass Index)          82 99  F (37.2  C) (Tympanic) 5' 4.5\" (1.638 m) 28.05 kg/m2         Blood Pressure from Last 3 Encounters:   17 121/75   17 120/73   17 110/84    Weight from Last 3 Encounters:   17 166 lb (75.3 kg)   17 169 lb 6.4 oz (76.8 kg)   17 165 lb (74.8 kg)              We Performed the Following     GASTROENTEROLOGY ADULT REF PROCEDURE ONLY     OTOLARYNGOLOGY REFERRAL          Today's Medication Changes          These changes are accurate as of: 17  9:17 AM.  If you have any questions, ask your nurse or doctor.               Stop taking these medicines if you haven't already. Please contact your care team if you have questions.     HYDROcodone-acetaminophen 5-325 MG per tablet   Commonly known as:  NORCO   Stopped by:  Fariha Sosa PA-C                    Primary Care Provider Office Phone # Fax #    Fariha Sosa PA-C [phone] [address] [place] [id number]        Equal Access to Services     ALFREDO CONKLIN AH: Robin Martin, jefry wasserman, clive guerreroarash la'aan ah. [facility] [phone].    ATENCIÓN: Si habla " español, tiene a leon disposición servicios gratuitos de asistencia lingüística. Lela man [phone].    We comply with applicable federal civil rights laws and [place] laws. We do not discriminate on the basis of race, color, national origin, age, disability, sex, sexual orientation, or gender identity.            Thank you!     Thank you for choosing [facility]  for your care. Our goal is always to provide you with excellent care. Hearing back from our patients is one way we can continue to improve our services. Please take a few minutes to complete the written survey that you may receive in the mail after your visit with us. Thank you!             Your Updated Medication List - Protect others around you: Learn how to safely use, store and throw away your medicines at www.disposemymeds.org.          This list is accurate as of: 12/14/17  9:17 AM.  Always use your most recent med list.                   Brand Name Dispense Instructions for use Diagnosis    cyclobenzaprine 10 MG tablet    FLEXERIL    30 tablet    Take 0.5-1 tablets (5-10 mg) by mouth 3 times daily as needed for muscle spasms    Anterolisthesis

## 2018-03-23 NOTE — ADDENDUM NOTE
Encounter addended by: Cynthia Knowles, PT on: 3/23/2018 12:31 PM<BR>     Actions taken: Sign clinical note, Episode resolved

## 2018-03-23 NOTE — PROGRESS NOTES
Outpatient Physical Therapy Discharge Note     Patient: Valery Bauman  : 1965    Beginning/End Dates of Reporting Period:  17 to 17 when initially seen.  D/C written on 3/23/2018.  Total # of Rx sessions: 1    Referring Provider: Carla Hines/ Christiano Davis     Therapy Diagnosis: Spondylolisthesis of LB, Grade 2     Client Self Report: P R LB when it does occur.     Objective Measurements:  Objective Measure: Giorgio   Details: 0    Objective Measure: VIRGEN   Details: 2    Objective Measure: Flexibility  Details: HS's R -45, L -20; Piriformis tight B.      Outcome Measures (most recent score):  Giorgio STarT Sub-Score (Q5-9): 0  Giorgio STarT Total Score (all 9): 0  Oswestry Score (%): 2 %    Goals:  Goal Identifier 1   Goal Description  STG: Pt will be independent w/HEP to strengthen core and prevent reinjury.    Target Date 17   Date Met      Progress:     Goal Identifier 2   Goal Description STG: Pt will be aware of limitations to prevent reinjury.    Target Date 17   Date Met      Progress:     Progress Toward Goals:   Not assessed this period.    Plan:  Discharge from therapy.    Discharge:    Reason for Discharge: Patient has failed to schedule further appointments.    Equipment Issued:      Discharge Plan: Patient to continue home program.  Pt to follow up w/MD as appropriate.   Cynthia Knowles, PT, [facility] (#[id number])  [facility]           [phone]  Fax          [phone]  Appt #      [phone]

## 2018-12-28 ENCOUNTER — TELEPHONE (OUTPATIENT)
Dept: FAMILY MEDICINE | Facility: CLINIC | Age: 53
End: 2018-12-28

## 2018-12-28 NOTE — TELEPHONE ENCOUNTER
Panel Management Review      Patient has the following on her problem list: None      Composite cancer screening  Chart review shows that this patient is due/due soon for the following Pap Smear, Mammogram and Colonoscopy  Summary:    Patient is due/failing the following:   COLONOSCOPY, MAMMOGRAM and PAP    Action needed:   Patient needs office visit for physical.    Type of outreach:    Phone, left message for patient to call back.     Questions for provider review:    None                                                                                                                                    Mary Cruz MA

## 2021-09-12 ENCOUNTER — HEALTH MAINTENANCE LETTER (OUTPATIENT)
Age: 56
End: 2021-09-12

## 2021-11-08 ENCOUNTER — E-VISIT (OUTPATIENT)
Dept: URGENT CARE | Facility: URGENT CARE | Age: 56
End: 2021-11-08
Payer: COMMERCIAL

## 2021-11-08 DIAGNOSIS — Z20.822 CLOSE EXPOSURE TO 2019 NOVEL CORONAVIRUS: Primary | ICD-10-CM

## 2021-11-08 PROCEDURE — 99421 OL DIG E/M SVC 5-10 MIN: CPT | Performed by: NURSE PRACTITIONER

## 2021-11-09 NOTE — PATIENT INSTRUCTIONS
"  Dear Valery Bauman,    Based on your exposure to COVID-19 (coronavirus), we would like to test you for this virus. I have placed an order for this test.The best time for testing is 5-7 days after the exposure.    How to schedule:  Go to your [employer] home page and scroll down to the section that says  You have an appointment that needs to be scheduled  and click the large green button that says  Schedule Now  and follow the steps to find the next available opening.     If you are unable to complete these [employer] scheduling steps, please call [phone] to schedule your testing.     Return to work/school/ guidance:   For people with high risk exposures outside the home    Please let your workplace manager and staffing office know when your quarantine ends.     We can not give you an exact date as it depends on the information below. You can calculate this on your own or work with your manager/staffing office to calculate this. (For example if you were exposed on 10/4, you would have to quarantine for 14 full days. That would be through 10/18. You could return on 10/19.)    Quarantine Guidelines:  Patients (\"contacts\") who have been in close prolonged contact of an infected person(s) (within six feet for at least 15 minutes within a 24 hour period), and remain asymptomatic should enter quarantine based on the following options:    14-day quarantine period (this remains the CDC recommendation for the greatest protection against spread of COVID-19) OR    Minimum 7-day quarantine with negative RT-PCR test collected on day 5 or later OR    10-day quarantine with no test  Quarantine Guideline exceptions are as follows:    People who have been fully vaccinated do not need to quarantine if the exposure was at least 2 weeks after the last vaccination. This includes vaccinated health care workers.    Not fully vaccinated and unvaccinated Individuals who work in health care, congregate care, or congregate living " should be off work for 14 days from their last date of exposure. Community activities for this group can be resumed based on options above. Fully vaccinated individuals in this group do not need to quarantine from work after exposure.    Not fully vaccinated and unvaccinated people whose high-risk exposure was a household member should always quarantine for 14 days from their last date of exposure. Fully vaccinated people in this category do not need to quarantine.    Not fully vaccinated or unvaccinated residents of congregate care and congregate living settings should always quarantine for 14 days from their last date of exposure. Fully vaccinated residents do not need to quarantine.  Note: If you have ongoing exposure to the covid positive person, this quarantine period may be more than 14 days. (For example, if you are continued to be exposed to your child who tested positive and cannot isolate from them, then the quarantine of 7-14 days can't start until your child is no longer contagious. This is typically 10 days from onset of the child's symptoms. So the total duration may be 17-24 days in this case.)    You should continue symptom monitoring until day 14 post-exposure. If you develop signs or symptoms of COVID-19, isolate and get tested (even if you have been tested already).    How to quarantine:   Stay home and away from others. Don't go to school or anywhere else. Generally quarantine means staying home from work but there are some exceptions to this. Please contact your workplace.  No hugging, kissing or shaking hands.  Don't let anyone visit.  Cover your mouth and nose with a mask, tissue or washcloth to avoid spreading germs.  Wash your hands and face often. Use soap and water.    What are the symptoms of COVID-19?  The most common symptoms are cough, fever and trouble breathing. Less common symptoms include headache, body aches, fatigue (feeling very tired), chills, sore throat, stuffy or runny nose,  diarrhea (loose poop), loss of taste or smell, belly pain, and nausea or vomiting (feeling sick to your stomach or throwing up).  After 14 days, if you have still don't have symptoms, you likely don't have this virus.  If you develop symptoms, follow these guidelines.  If you're normally healthy: Please start another eVisit.  If you have a serious health problem (like cancer, heart failure, an organ transplant or kidney disease): Call your specialty clinic. Let them know that you might have COVID-19.    Where can I get more information?  [facility] - About COVID-19: [email or web address]  CDC - What to Do If You're Sick: www.cdc.gov/coronavirus/2019-ncov/about/steps-when-sick.html  CDC - Ending Home Isolation: www.cdc.gov/coronavirus/2019-ncov/hcp/disposition-in-home-patients.html  CDC - Caring for Someone: www.cdc.gov/coronavirus/2019-ncov/if-you-are-sick/care-for-someone.html  [facility] clinical trials (COVID-19 research studies): clinicalaffairs.[email or web address]-clinical-trials  Below are the COVID-19 hotlines at the [place] Department of Health ([facility]). Interpreters are available.  For health questions: Call 971-580-6372 or 1-667.394.8149 (7 a.m. to 7 p.m.)  For questions about schools and childcare: Call 634-144-0444 or 1-837.922.1125 (7 a.m. to 7 p.m.)

## 2022-11-19 ENCOUNTER — HEALTH MAINTENANCE LETTER (OUTPATIENT)
Age: 57
End: 2022-11-19

## 2023-11-19 ENCOUNTER — HEALTH MAINTENANCE LETTER (OUTPATIENT)
Age: 58
End: 2023-11-19

## 2024-01-28 ENCOUNTER — HEALTH MAINTENANCE LETTER (OUTPATIENT)
Age: 59
End: 2024-01-28

## 2024-11-20 SDOH — HEALTH STABILITY: PHYSICAL HEALTH: ON AVERAGE, HOW MANY MINUTES DO YOU ENGAGE IN EXERCISE AT THIS LEVEL?: 100 MIN

## 2024-11-20 SDOH — HEALTH STABILITY: PHYSICAL HEALTH: ON AVERAGE, HOW MANY DAYS PER WEEK DO YOU ENGAGE IN MODERATE TO STRENUOUS EXERCISE (LIKE A BRISK WALK)?: 5 DAYS

## 2024-11-20 ASSESSMENT — SOCIAL DETERMINANTS OF HEALTH (SDOH): HOW OFTEN DO YOU GET TOGETHER WITH FRIENDS OR RELATIVES?: NEVER

## 2024-11-26 ENCOUNTER — OFFICE VISIT (OUTPATIENT)
Dept: FAMILY MEDICINE | Facility: CLINIC | Age: 59
End: 2024-11-26
Payer: COMMERCIAL

## 2024-11-26 VITALS
SYSTOLIC BLOOD PRESSURE: 130 MMHG | OXYGEN SATURATION: 99 % | HEART RATE: 71 BPM | RESPIRATION RATE: 20 BRPM | WEIGHT: 172.6 LBS | HEIGHT: 65 IN | DIASTOLIC BLOOD PRESSURE: 88 MMHG | TEMPERATURE: 98.4 F | BODY MASS INDEX: 28.76 KG/M2

## 2024-11-26 DIAGNOSIS — Z12.4 CERVICAL CANCER SCREENING: ICD-10-CM

## 2024-11-26 DIAGNOSIS — Z11.4 SCREENING FOR HIV (HUMAN IMMUNODEFICIENCY VIRUS): ICD-10-CM

## 2024-11-26 DIAGNOSIS — Z00.00 ROUTINE GENERAL MEDICAL EXAMINATION AT A HEALTH CARE FACILITY: Primary | ICD-10-CM

## 2024-11-26 DIAGNOSIS — Z11.59 NEED FOR HEPATITIS C SCREENING TEST: ICD-10-CM

## 2024-11-26 DIAGNOSIS — Z12.11 SCREEN FOR COLON CANCER: ICD-10-CM

## 2024-11-26 PROCEDURE — 87624 HPV HI-RISK TYP POOLED RSLT: CPT | Performed by: FAMILY MEDICINE

## 2024-11-26 PROCEDURE — 99386 PREV VISIT NEW AGE 40-64: CPT | Performed by: FAMILY MEDICINE

## 2024-11-26 ASSESSMENT — PAIN SCALES - GENERAL: PAINLEVEL_OUTOF10: NO PAIN (0)

## 2024-11-26 NOTE — PATIENT INSTRUCTIONS
Patient Education   Preventive Care Advice   This is general advice given by our system to help you stay healthy. However, your care team may have specific advice just for you. Please talk to your care team about your preventive care needs.  Nutrition  Eat 5 or more servings of fruits and vegetables each day.  Try wheat bread, brown rice and whole grain pasta (instead of white bread, rice, and pasta).  Get enough calcium and vitamin D. Check the label on foods and aim for 100% of the RDA (recommended daily allowance).  Lifestyle  Exercise at least 150 minutes each week  (30 minutes a day, 5 days a week).  Do muscle strengthening activities 2 days a week. These help control your weight and prevent disease.  No smoking.  Wear sunscreen to prevent skin cancer.  Have a dental exam and cleaning every 6 months.  Yearly exams  See your health care team every year to talk about:  Any changes in your health.  Any medicines your care team has prescribed.  Preventive care, family planning, and ways to prevent chronic diseases.  Shots (vaccines)   HPV shots (up to age 26), if you've never had them before.  Hepatitis B shots (up to age 59), if you've never had them before.  COVID-19 shot: Get this shot when it's due.  Flu shot: Get a flu shot every year.  Tetanus shot: Get a tetanus shot every 10 years.  Pneumococcal, hepatitis A, and RSV shots: Ask your care team if you need these based on your risk.  Shingles shot (for age 50 and up)  General health tests  Diabetes screening:  Starting at age 35, Get screened for diabetes at least every 3 years.  If you are younger than age 35, ask your care team if you should be screened for diabetes.  Cholesterol test: At age 39, start having a cholesterol test every 5 years, or more often if advised.  Bone density scan (DEXA): At age 50, ask your care team if you should have this scan for osteoporosis (brittle bones).  Hepatitis C: Get tested at least once in your life.  STIs (sexually  transmitted infections)  Before age 24: Ask your care team if you should be screened for STIs.  After age 24: Get screened for STIs if you're at risk. You are at risk for STIs (including HIV) if:  You are sexually active with more than one person.  You don't use condoms every time.  You or a partner was diagnosed with a sexually transmitted infection.  If you are at risk for HIV, ask about PrEP medicine to prevent HIV.  Get tested for HIV at least once in your life, whether you are at risk for HIV or not.  Cancer screening tests  Cervical cancer screening: If you have a cervix, begin getting regular cervical cancer screening tests starting at age 21.  Breast cancer scan (mammogram): If you've ever had breasts, begin having regular mammograms starting at age 40. This is a scan to check for breast cancer.  Colon cancer screening: It is important to start screening for colon cancer at age 45.  Have a colonoscopy test every 10 years (or more often if you're at risk) Or, ask your provider about stool tests like a FIT test every year or Cologuard test every 3 years.  To learn more about your testing options, visit:   .  For help making a decision, visit:   https://bit.ly/pj50599.  Prostate cancer screening test: If you have a prostate, ask your care team if a prostate cancer screening test (PSA) at age 55 is right for you.  Lung cancer screening: If you are a current or former smoker ages 50 to 80, ask your care team if ongoing lung cancer screenings are right for you.  For informational purposes only. Not to replace the advice of your health care provider. Copyright   2023 [employer]. All rights reserved. Clinically reviewed by the [facility] Transitions Program. [employer] 184274 - REV 01/24.

## 2024-11-26 NOTE — PROGRESS NOTES
"Preventive Care Visit  [facility]  Santana Sanabria MD, Family Medicine  Nov 26, 2024      Assessment & Plan     Routine general medical examination at a health care facility  Medically doing well.  Recommended to continue regular exercise, healthy diet.  - Glucose; Future  - Lipid panel reflex to direct LDL Non-fasting; Future    Cervical cancer screening  Cervical Pap smear obtained  - HPV and Gynecologic Cytology Panel - Recommended Age 30 - 65 Years    Screen for colon cancer  Screening colonoscopy ordered  - Colonoscopy Screening  Referral; Future    Screening for HIV (human immunodeficiency virus)  - HIV Antigen Antibody Combo; Future    Need for hepatitis C screening test  - Hepatitis C Screen Reflex to HCV RNA Quant and Genotype; Future      BMI  Estimated body mass index is 29.17 kg/m  as calculated from the following:    Height as of this encounter: 1.638 m (5' 4.5\").    Weight as of this encounter: 78.3 kg (172 lb 9.6 oz).   Weight management plan: Discussed healthy diet and exercise guidelines    Counseling  Appropriate preventive services were addressed with this patient via screening, questionnaire, or discussion as appropriate for fall prevention, nutrition, physical activity, Tobacco-use cessation, social engagement, weight loss and cognition.  Checklist reviewing preventive services available has been given to the patient.  Reviewed patient's diet, addressing concerns and/or questions.   Patient is at risk for social isolation and has been provided with information about the benefit of social connection.   The patient was instructed to see the dentist every 6 months.       Carla Rasmussen is a 59 year old, presenting for the following:  Physical        11/26/2024     4:39 PM   Additional Questions   Roomed by Caroline MEDLEY LPN   Accompanied by Self          HPI    Health Care Directive  Patient does not have a Health Care Directive: Discussed advance care planning " with patient; however, patient declined at this time.      11/20/2024   General Health   How would you rate your overall physical health? Excellent   Feel stress (tense, anxious, or unable to sleep) Only a little      (!) STRESS CONCERN      11/20/2024   Nutrition   Three or more servings of calcium each day? Yes   Diet: Regular (no restrictions)    Other   If other, please elaborate: No mushrooms.   How many servings of fruit and vegetables per day? (!) 0-1   How many sweetened beverages each day? 0-1       Multiple values from one day are sorted in reverse-chronological order         11/20/2024   Exercise   Days per week of moderate/strenous exercise 5 days   Average minutes spent exercising at this level 100 min            11/20/2024   Social Factors   Frequency of gathering with friends or relatives Never   Worry food won't last until get money to buy more No   Food not last or not have enough money for food? No   Do you have housing? (Housing is defined as stable permanent housing and does not include staying ouside in a car, in a tent, in an abandoned building, in an overnight shelter, or couch-surfing.) Yes   Are you worried about losing your housing? No   Lack of transportation? No   Unable to get utilities (heat,electricity)? No      (!) SOCIAL CONNECTIONS CONCERN      11/20/2024   Fall Risk   Fallen 2 or more times in the past year? No    Trouble with walking or balance? No        Patient-reported          11/20/2024   Dental   Dentist two times every year? (!) NO            11/20/2024   TB Screening   Were you born outside of the [place]? No            Today's PHQ-2 Score:       11/25/2024     6:28 PM   PHQ-2 ( 1999 [employer])   Q1: Little interest or pleasure in doing things 0    Q2: Feeling down, depressed or hopeless 0    PHQ-2 Score 0    Q1: Little interest or pleasure in doing things Not at all   Q2: Feeling down, depressed or hopeless Not at all   PHQ-2 Score 0       Patient-reported           11/20/2024    Substance Use   Alcohol more than 3/day or more than 7/wk No   Do you use any other substances recreationally? No        Social History     Tobacco Use    Smoking status: Never    Smokeless tobacco: Never   Vaping Use    Vaping status: Never Used   Substance Use Topics    Alcohol use: Yes     Comment: social    Drug use: No                    11/20/2024   One time HIV Screening   Previous HIV test? No          11/20/2024   STI Screening   New sexual partner(s) since last STI/HIV test? No        History of  Pap smear: reflected in Problem List and Health Maintenance accordingly       ASCVD Risk   The ASCVD Risk score (Alex BOOGIE, et al., 2019) failed to calculate for the following reasons:    Cannot find a previous HDL lab    Cannot find a previous total cholesterol lab    The BP treatment status is invalid           Reviewed and updated as needed this visit by Provider   Tobacco  Allergies  Meds  Problems  Med Hx  Surg Hx  Fam Hx            History reviewed. No pertinent past medical history.  Past Surgical History:   Procedure Laterality Date    HC KNEE SCOPE,MED/LAT MENISCUS REPAIR      meniscus repair    SURGICAL HISTORY OF -   age 7    right eye-lazy eye surgery    SURGICAL HISTORY OF -   11/2004    arthroscopy right knee     OB History   No obstetric history on file.     Lab work is in process  Labs reviewed in EPIC  BP Readings from Last 3 Encounters:   11/26/24 130/88   12/14/17 121/75   11/22/17 120/73    Wt Readings from Last 3 Encounters:   11/26/24 78.3 kg (172 lb 9.6 oz)   12/14/17 75.3 kg (166 lb)   11/22/17 76.8 kg (169 lb 6.4 oz)                  Patient Active Problem List   Diagnosis    CARDIOVASCULAR SCREENING; LDL GOAL LESS THAN 160     Past Surgical History:   Procedure Laterality Date    HC KNEE SCOPE,MED/LAT MENISCUS REPAIR      meniscus repair    SURGICAL HISTORY OF -   age 7    right eye-lazy eye surgery    SURGICAL HISTORY OF -   11/2004    arthroscopy right knee      "  Social History     Tobacco Use    Smoking status: Never    Smokeless tobacco: Never   Substance Use Topics    Alcohol use: Yes     Comment: social     Family History   Problem Relation Age of Onset    Hypertension Mother     Hypertension Brother     Colon Cancer Maternal Grandmother         in her 70s    Breast Cancer Maternal Grandfather         in her 80s    Cerebrovascular Disease No family hx of     C.A.D. No family hx of     Diabetes No family hx of     Thyroid Disease No family hx of          No current outpatient medications on file.     Allergies   Allergen Reactions    Mushroom      No lab results found.       Review of Systems  Constitutional, neuro, ENT, endocrine, pulmonary, cardiac, gastrointestinal, genitourinary, musculoskeletal, integument and psychiatric systems are negative, except as otherwise noted.     Objective    Exam  /88   Pulse 71   Temp 98.4  F (36.9  C) (Tympanic)   Resp 20   Ht 1.638 m (5' 4.5\")   Wt 78.3 kg (172 lb 9.6 oz)   LMP 05/02/2014 (Exact Date)   SpO2 99%   BMI 29.17 kg/m     Estimated body mass index is 29.17 kg/m  as calculated from the following:    Height as of this encounter: 1.638 m (5' 4.5\").    Weight as of this encounter: 78.3 kg (172 lb 9.6 oz).    Physical Exam  GENERAL: alert and no distress  EYES: Eyes grossly normal to inspection, PERRL and conjunctivae and sclerae normal  HENT: normal cephalic/atraumatic, nose and mouth without ulcers or lesions, oropharynx clear, and oral mucous membranes moist  NECK: no adenopathy, no asymmetry, masses, or scars  RESP: lungs clear to auscultation - no rales, rhonchi or wheezes  CV: regular rate and rhythm, normal S1 S2, no S3 or S4, no murmur, click or rub, no peripheral edema  ABDOMEN: soft, nontender, no hepatosplenomegaly, no masses and bowel sounds normal   (female): normal female external genitalia, normal urethral meatus , normal vaginal mucosa, and normal cervix, adnexae, and uterus without masses.  MS: " no gross musculoskeletal defects noted, no edema  SKIN: no suspicious lesions or rashes  NEURO: Normal strength and tone, mentation intact and speech normal  PSYCH: mentation appears normal, affect normal/bright        Signed Electronically by: Santana Sanabria MD

## 2024-11-27 LAB
HPV HR 12 DNA CVX QL NAA+PROBE: NEGATIVE
HPV16 DNA CVX QL NAA+PROBE: NEGATIVE
HPV18 DNA CVX QL NAA+PROBE: NEGATIVE
HUMAN PAPILLOMA VIRUS FINAL DIAGNOSIS: NORMAL

## 2024-11-30 ENCOUNTER — LAB (OUTPATIENT)
Dept: LAB | Facility: CLINIC | Age: 59
End: 2024-11-30
Payer: COMMERCIAL

## 2024-11-30 DIAGNOSIS — Z00.00 ROUTINE GENERAL MEDICAL EXAMINATION AT A HEALTH CARE FACILITY: ICD-10-CM

## 2024-11-30 DIAGNOSIS — Z11.59 NEED FOR HEPATITIS C SCREENING TEST: ICD-10-CM

## 2024-11-30 DIAGNOSIS — Z11.4 SCREENING FOR HIV (HUMAN IMMUNODEFICIENCY VIRUS): ICD-10-CM

## 2024-11-30 LAB
CHOLEST SERPL-MCNC: 155 MG/DL
FASTING STATUS PATIENT QL REPORTED: YES
FASTING STATUS PATIENT QL REPORTED: YES
GLUCOSE SERPL-MCNC: 101 MG/DL (ref 70–99)
HCV AB SERPL QL IA: NONREACTIVE
HDLC SERPL-MCNC: 57 MG/DL
HIV 1+2 AB+HIV1 P24 AG SERPL QL IA: NONREACTIVE
LDLC SERPL CALC-MCNC: 80 MG/DL
NONHDLC SERPL-MCNC: 98 MG/DL
TRIGL SERPL-MCNC: 90 MG/DL

## 2024-11-30 PROCEDURE — 36415 COLL VENOUS BLD VENIPUNCTURE: CPT

## 2024-11-30 PROCEDURE — 86803 HEPATITIS C AB TEST: CPT

## 2024-11-30 PROCEDURE — 80061 LIPID PANEL: CPT

## 2024-11-30 PROCEDURE — 87389 HIV-1 AG W/HIV-1&-2 AB AG IA: CPT

## 2024-11-30 PROCEDURE — 82947 ASSAY GLUCOSE BLOOD QUANT: CPT

## 2024-12-03 LAB
BKR AP ASSOCIATED HPV REPORT: NORMAL
BKR LAB AP GYN ADEQUACY: NORMAL
BKR LAB AP GYN INTERPRETATION: NORMAL
BKR LAB AP PREVIOUS ABNORMAL: NORMAL
PATH REPORT.COMMENTS IMP SPEC: NORMAL
PATH REPORT.COMMENTS IMP SPEC: NORMAL
PATH REPORT.RELEVANT HX SPEC: NORMAL

## 2024-12-06 ENCOUNTER — MYC MEDICAL ADVICE (OUTPATIENT)
Dept: FAMILY MEDICINE | Facility: CLINIC | Age: 59
End: 2024-12-06
Payer: COMMERCIAL

## 2024-12-17 ENCOUNTER — TELEPHONE (OUTPATIENT)
Dept: GASTROENTEROLOGY | Facility: CLINIC | Age: 59
End: 2024-12-17
Payer: COMMERCIAL

## 2024-12-17 NOTE — TELEPHONE ENCOUNTER
Endoscopy Scheduling Screen      What insurance is in the chart?  Other:  [facility]    Ordering/Referring Provider: Santana Sanabria   (If ordering provider performs procedure, schedule with ordering provider unless otherwise instructed. )    BMI: There is no height or weight on file to calculate BMI.  29.17    Sedation Ordered  general anesthesia.   BMI<= 45 45 < BMI <= 48 48 < BMI < = 50  BMI > 50   No Restrictions No [facility]  No [facility]  [facility] with exceptions Hospital Only OR Only       Do you have a history of malignant hyperthermia?  NO    (Females) Are you currently pregnant?   NO     Are you currently on dialysis?   NO    Do you need assistance transferring?   NO    BMI: There is no height or weight on file to calculate BMI.     Is patients BMI > 50?  NO    BMI > 40?  NO    Do you have a diagnosis of diabetes?  NO    Do you take an injectable medication for weight loss or diabetes (excluding insulin)?  NO    Do you take the medication Naltrexone?  NO    Do you take blood thinners?  NO    Prep   Are you currently on dialysis or do you have chronic kidney disease?  NO    Do you have a diagnosis of cystic fibrosis (CF)?  NO    On a regular basis do you go 3 -5 days between bowel movements?  NO    Preferred Pharmacy:    [facility] [address]   [address] [facility] [address]  Phone: [phone] Fax: [phone]      Final Scheduling Details     Procedure scheduled  Colonoscopy    Surgeon:  Leanne     Date of procedure:  3-5-25     Location  [place] - Patient preference.    What is your communication preference for Instructions and/or Bowel Prep?   [facility]    Patient Reminders:    You will receive a call from a Nurse to review instructions and health history.  This assessment must be completed prior to your procedure.  Failure to complete the Nurse assessment may result in the procedure being cancelled.       On the day of your procedure, please designate  an adult(s) who can drive you home stay with you for the next 24 hours. The medicines used in the exam will make you sleepy. You will not be able to drive.       You cannot take public transportation, ride share services, or non-medical taxi service without a responsible caregiver.  Medical transport services are allowed with the requirement that a responsible caregiver will receive you at your destination.  We require that drivers and caregivers are confirmed prior to your procedure.

## 2024-12-23 ENCOUNTER — OFFICE VISIT (OUTPATIENT)
Dept: OBGYN | Facility: CLINIC | Age: 59
End: 2024-12-23
Payer: COMMERCIAL

## 2024-12-23 VITALS
HEART RATE: 103 BPM | DIASTOLIC BLOOD PRESSURE: 79 MMHG | SYSTOLIC BLOOD PRESSURE: 130 MMHG | TEMPERATURE: 99.2 F | WEIGHT: 175 LBS | RESPIRATION RATE: 18 BRPM | HEIGHT: 65 IN | BODY MASS INDEX: 29.16 KG/M2

## 2024-12-23 DIAGNOSIS — N63.14 MASS OF LOWER INNER QUADRANT OF RIGHT BREAST: Primary | ICD-10-CM

## 2024-12-23 PROCEDURE — 99203 OFFICE O/P NEW LOW 30 MIN: CPT | Performed by: OBSTETRICS & GYNECOLOGY

## 2024-12-23 NOTE — PROGRESS NOTES
[facility]  OB/GYN Clinic   Gynecology Consult Note    CC:  breast lump   Chief Complaint   Patient presents with    Breast Problem     Right breast lump         HPI: Ms. Bauman is a 59 year old  being seen for GYN consultation for new finding of right breast lump by the request of her provider, JACQUELYN Ward.  Today she reports no pain with mass.     GYN Hx: P0 menopause age 49-50, no hormone replacement therapy.       ROS: A 10 pt ROS was completed and found to be otherwise negative unless mentioned in the HPI.     PMH: no previous breast masses or surgery      PSHx:   Past Surgical History:   Procedure Laterality Date    HC KNEE SCOPE,MED/LAT MENISCUS REPAIR      meniscus repair    SURGICAL HISTORY OF -   age 7    right eye-lazy eye surgery    SURGICAL HISTORY OF -   2004    arthroscopy right knee       OBHx:   OB History    Para Term  AB Living   0 0 0 0 0 0   SAB IAB Ectopic Multiple Live Births   0 0 0 0 0       Medications: none  No current outpatient medications on file.     No current facility-administered medications for this visit.       Allergies: none     Allergies   Allergen Reactions    Mushroom        Social History:   Social History     Socioeconomic History    Marital status: Single     Spouse name: Not on file    Number of children: Not on file    Years of education: Not on file    Highest education level: Not on file   Occupational History    Not on file   Tobacco Use    Smoking status: Never    Smokeless tobacco: Never   Vaping Use    Vaping status: Never Used   Substance and Sexual Activity    Alcohol use: Yes     Comment: social    Drug use: No    Sexual activity: Yes     Partners: Male     Birth control/protection: Condom   Other Topics Concern     Service Yes    Blood Transfusions No    Caffeine Concern No    Occupational Exposure No    Hobby Hazards No    Sleep Concern No    Stress Concern No    Weight Concern No    Special Diet No     Back Care No    Exercise No    Bike Helmet No    Seat Belt Yes    Self-Exams Yes    Parent/sibling w/ CABG, MI or angioplasty before 65F 55M? No   Social History Narrative    Not on file     Social Drivers of Health     Financial Resource Strain: Low Risk  (11/20/2024)    Financial Resource Strain     Within the past 12 months, have you or your family members you live with been unable to get utilities (heat, electricity) when it was really needed?: No   Food Insecurity: Low Risk  (11/20/2024)    Food Insecurity     Within the past 12 months, did you worry that your food would run out before you got money to buy more?: No     Within the past 12 months, did the food you bought just not last and you didn t have money to get more?: No   Transportation Needs: Low Risk  (11/20/2024)    Transportation Needs     Within the past 12 months, has lack of transportation kept you from medical appointments, getting your medicines, non-medical meetings or appointments, work, or from getting things that you need?: No   Physical Activity: Sufficiently Active (11/20/2024)    Exercise Vital Sign     Days of Exercise per Week: 5 days     Minutes of Exercise per Session: 100 min   Stress: No Stress Concern Present (11/20/2024)    [employer] [place] [employer] - Occupational Stress Questionnaire     Feeling of Stress : Only a little   Social Connections: Unknown (11/20/2024)    Social Connection and Isolation Panel [NHANES]     Frequency of Communication with Friends and Family: Not on file     Frequency of Social Gatherings with Friends and Family: Never     Attends Anabaptism Services: Not on file     Active Member of Clubs or Organizations: Not on file     Attends Club or Organization Meetings: Not on file     Marital Status: Not on file   Interpersonal Safety: Low Risk  (11/26/2024)    Interpersonal Safety     Do you feel physically and emotionally safe where you currently live?: Yes     Within the past 12 months, have  you been hit, slapped, kicked or otherwise physically hurt by someone?: No     Within the past 12 months, have you been humiliated or emotionally abused in other ways by your partner or ex-partner?: No   Housing Stability: Low Risk  (11/20/2024)    Housing Stability     Do you have housing? : Yes     Are you worried about losing your housing?: No     Social History     Socioeconomic History    Marital status: Single     Spouse name: None    Number of children: None    Years of education: None    Highest education level: None   Tobacco Use    Smoking status: Never    Smokeless tobacco: Never   Vaping Use    Vaping status: Never Used   Substance and Sexual Activity    Alcohol use: Yes     Comment: social    Drug use: No    Sexual activity: Yes     Partners: Male     Birth control/protection: Condom   Other Topics Concern     Service Yes    Blood Transfusions No    Caffeine Concern No    Occupational Exposure No    Hobby Hazards No    Sleep Concern No    Stress Concern No    Weight Concern No    Special Diet No    Back Care No    Exercise No    Bike Helmet No    Seat Belt Yes    Self-Exams Yes    Parent/sibling w/ CABG, MI or angioplasty before 65F 55M? No     Social Drivers of Health     Financial Resource Strain: Low Risk  (11/20/2024)    Financial Resource Strain     Within the past 12 months, have you or your family members you live with been unable to get utilities (heat, electricity) when it was really needed?: No   Food Insecurity: Low Risk  (11/20/2024)    Food Insecurity     Within the past 12 months, did you worry that your food would run out before you got money to buy more?: No     Within the past 12 months, did the food you bought just not last and you didn t have money to get more?: No   Transportation Needs: Low Risk  (11/20/2024)    Transportation Needs     Within the past 12 months, has lack of transportation kept you from medical appointments, getting your medicines, non-medical meetings or  "appointments, work, or from getting things that you need?: No   Physical Activity: Sufficiently Active (11/20/2024)    Exercise Vital Sign     Days of Exercise per Week: 5 days     Minutes of Exercise per Session: 100 min   Stress: No Stress Concern Present (11/20/2024)    [employer] [place] [employer] - Occupational Stress Questionnaire     Feeling of Stress : Only a little   Social Connections: Unknown (11/20/2024)    Social Connection and Isolation Panel [NHANES]     Frequency of Social Gatherings with Friends and Family: Never   Interpersonal Safety: Low Risk  (11/26/2024)    Interpersonal Safety     Do you feel physically and emotionally safe where you currently live?: Yes     Within the past 12 months, have you been hit, slapped, kicked or otherwise physically hurt by someone?: No     Within the past 12 months, have you been humiliated or emotionally abused in other ways by your partner or ex-partner?: No   Housing Stability: Low Risk  (11/20/2024)    Housing Stability     Do you have housing? : Yes     Are you worried about losing your housing?: No       Family History:   Family History   Problem Relation Age of Onset    Hypertension Mother     Hypertension Brother     Colon Cancer Maternal Grandmother         in her 70s    Breast Cancer Maternal Grandfather         in her 80s    Cerebrovascular Disease No family hx of     C.A.D. No family hx of     Diabetes No family hx of     Thyroid Disease No family hx of      Denies any family hx of bleeding disorders or reaction to anesthesia.     Physical Exam:   Vitals:    12/23/24 1430   BP: 130/79   BP Location: Right arm   Patient Position: Chair   Cuff Size: Adult Regular   Pulse: 103   Resp: 18   Temp: 99.2  F (37.3  C)   TempSrc: Tympanic   Weight: 79.4 kg (175 lb)   Height: 1.638 m (5' 4.5\")      Estimated body mass index is 29.57 kg/m  as calculated from the following:    Height as of this encounter: 1.638 m (5' 4.5\").    Weight as of this " encounter: 79.4 kg (175 lb).    Gen: Pleasant, talkative female in no apparent distress   Breasts: symmetric and without erythema or skin changes. No nipple discharge. 1/2 cm firm mass immediately below skin right breast @ 4:00, mobile.  No surrounding retraction or erythema.     Labs/Imaging: last mammo 2014    A&P: breast mass - most likely sebaceous cyst as doesn't involve underlying breast tissue.   Recommend routine mammo and possible breast ultrasound of massto document benign nature.     Greta Tidwell MD

## 2024-12-23 NOTE — NURSING NOTE
"Initial /79 (BP Location: Right arm, Patient Position: Chair, Cuff Size: Adult Regular)   Pulse 103   Temp 99.2  F (37.3  C) (Tympanic)   Resp 18   Ht 1.638 m (5' 4.5\")   Wt 79.4 kg (175 lb)   LMP 05/02/2014 (Exact Date)   BMI 29.57 kg/m   Estimated body mass index is 29.57 kg/m  as calculated from the following:    Height as of this encounter: 1.638 m (5' 4.5\").    Weight as of this encounter: 79.4 kg (175 lb). .    "

## 2024-12-26 ENCOUNTER — PATIENT OUTREACH (OUTPATIENT)
Dept: CARE COORDINATION | Facility: CLINIC | Age: 59
End: 2024-12-26
Payer: COMMERCIAL

## 2025-02-03 ENCOUNTER — ANCILLARY PROCEDURE (OUTPATIENT)
Dept: MAMMOGRAPHY | Facility: CLINIC | Age: 60
End: 2025-02-03
Attending: OBSTETRICS & GYNECOLOGY
Payer: COMMERCIAL

## 2025-02-03 DIAGNOSIS — N63.14 MASS OF LOWER INNER QUADRANT OF RIGHT BREAST: ICD-10-CM

## 2025-02-03 PROCEDURE — 77067 SCR MAMMO BI INCL CAD: CPT | Mod: TC | Performed by: RADIOLOGY

## 2025-02-03 PROCEDURE — 77063 BREAST TOMOSYNTHESIS BI: CPT | Mod: TC | Performed by: RADIOLOGY

## 2025-03-07 ENCOUNTER — HOSPITAL ENCOUNTER (OUTPATIENT)
Facility: CLINIC | Age: 60
Discharge: HOME | End: 2025-03-07
Attending: SURGERY | Admitting: SURGERY
Payer: COMMERCIAL

## 2025-03-07 VITALS
HEART RATE: 86 BPM | OXYGEN SATURATION: 96 % | HEIGHT: 65 IN | WEIGHT: 175 LBS | BODY MASS INDEX: 29.16 KG/M2 | TEMPERATURE: 97.5 F | DIASTOLIC BLOOD PRESSURE: 90 MMHG | SYSTOLIC BLOOD PRESSURE: 117 MMHG | RESPIRATION RATE: 16 BRPM

## 2025-03-07 LAB — COLONOSCOPY: NORMAL

## 2025-03-07 PROCEDURE — 258N000003 HC RX IP 258 OP 636: Performed by: PHYSICIAN ASSISTANT

## 2025-03-07 PROCEDURE — 370N000017 HC ANESTHESIA TECHNICAL FEE, PER MIN: Performed by: SURGERY

## 2025-03-07 PROCEDURE — G0121 COLON CA SCRN NOT HI RSK IND: HCPCS | Performed by: SURGERY

## 2025-03-07 PROCEDURE — 45378 DIAGNOSTIC COLONOSCOPY: CPT | Performed by: SURGERY

## 2025-03-07 RX ORDER — LIDOCAINE 40 MG/G
CREAM TOPICAL
Status: DISCONTINUED | OUTPATIENT
Start: 2025-03-07 | End: 2025-03-07 | Stop reason: HOSPADM

## 2025-03-07 RX ORDER — SODIUM CHLORIDE, SODIUM LACTATE, POTASSIUM CHLORIDE, CALCIUM CHLORIDE 600; 310; 30; 20 MG/100ML; MG/100ML; MG/100ML; MG/100ML
INJECTION, SOLUTION INTRAVENOUS CONTINUOUS
Status: DISCONTINUED | OUTPATIENT
Start: 2025-03-07 | End: 2025-03-07 | Stop reason: HOSPADM

## 2025-03-07 RX ADMIN — SODIUM CHLORIDE, POTASSIUM CHLORIDE, SODIUM LACTATE AND CALCIUM CHLORIDE: 600; 310; 30; 20 INJECTION, SOLUTION INTRAVENOUS at 09:24

## 2025-03-07 ASSESSMENT — ACTIVITIES OF DAILY LIVING (ADL)
ADLS_ACUITY_SCORE: 41
ADLS_ACUITY_SCORE: 41

## 2025-03-07 NOTE — H&P
[facility]    Pre-Endoscopy History and Physical     Valery Bauman MRN# [id number]   YOB: 1965 Age: 59 year old     Date of Procedure: 3/7/2025  Primary care provider: Fariha Sosa  Type of Endoscopy: Colonoscopy with possible biopsy, possible polypectomy  Reason for Procedure: Screening colonoscopy  Type of Anesthesia Anticipated: Conscious Sedation    HPI:    Valery is a 59 year old female who will be undergoing the above procedure.      A history and physical has been performed. The patient's medications and allergies have been reviewed. The risks and benefits of the procedure and the sedation options and risks were discussed with the patient.  All questions were answered and informed consent was obtained.      She denies a personal or family history of anesthesia complications or bleeding disorders. Denies taking any blood thinners.    Colonoscopy purely for screening purposes, denies abdominal pain, nausea, emesis, diarrhea, constipation, hematochezia, melena.    Patient Active Problem List   Diagnosis    CARDIOVASCULAR SCREENING; LDL GOAL LESS THAN 160    Acute febrile neutrophilic dermatosis    Arthritis    Erythema nodosum    Rash and other nonspecific skin eruption    Stomach upset        History reviewed. No pertinent past medical history.     Past Surgical History:   Procedure Laterality Date    HC KNEE SCOPE,MED/LAT MENISCUS REPAIR      meniscus repair    SURGICAL HISTORY OF -   age 7    right eye-lazy eye surgery    SURGICAL HISTORY OF -   11/2004    arthroscopy right knee       Social History     Tobacco Use    Smoking status: Never    Smokeless tobacco: Never   Substance Use Topics    Alcohol use: Yes     Comment: social       Family History   Problem Relation Age of Onset    Hypertension Mother     Hypertension Brother     Colon Cancer Maternal Grandmother         in her 70s    Breast Cancer Maternal Grandfather         in her 80s    Cerebrovascular Disease  "No family hx of     C.A.D. No family hx of     Diabetes No family hx of     Thyroid Disease No family hx of        Prior to Admission medications    Not on File       Allergies   Allergen Reactions    Mushroom         REVIEW OF SYSTEMS:   5 point ROS negative except as noted above in HPI, including Gen., Resp., CV, GI &  system review.    PHYSICAL EXAM:   /89 (BP Location: Left arm)   Pulse 91   Temp 98  F (36.7  C) (Oral)   Ht 1.638 m (5' 4.5\")   Wt 79.4 kg (175 lb)   LMP 05/02/2014 (Exact Date)   SpO2 100%   BMI 29.57 kg/m   Estimated body mass index is 29.57 kg/m  as calculated from the following:    Height as of this encounter: 1.638 m (5' 4.5\").    Weight as of this encounter: 79.4 kg (175 lb).   Constitutional: Awake, alert, no acute distress.  Eyes: No scleral icterus.  Conjunctiva are without injection.  ENMT: Mucous membranes moist, dentition and gums are intact.   Neck: Soft, supple, trachea midline.    Endocrine: n/a   Lymphatic: There is no cervical, submandibularadenopathy.  Respiratory: normal effortgs   Cardiovascular: S1, S2  Abdomen: Non-distended, non-tender,  No masses,  Musculoskeletal: No spinal or CVA tenderness. Full range of motion in the upper and lower extremities.    Skin: No skin rashes or lesions to inspection.  No petechia.    Neurologic: alerted and oriented 3x  Psychiatric: The patient's affect is not blunted and mood is appropriate.  DIAGNOSTICS:    Not indicated    IMPRESSION   ASA Class 1 - Healthy patient, no medical problems    PLAN:   Plan for Colonoscopy with possible biopsy, possible polypectomy. We discussed the risks, benefits and alternatives and the patient wished to proceed.  Patient is cleared for the above procedure.    The above has been forwarded to the consulting provider.    Cris Bear, DO PGY-2  [place] [facility]  "